# Patient Record
Sex: MALE | Race: BLACK OR AFRICAN AMERICAN | NOT HISPANIC OR LATINO
[De-identification: names, ages, dates, MRNs, and addresses within clinical notes are randomized per-mention and may not be internally consistent; named-entity substitution may affect disease eponyms.]

---

## 2017-07-11 ENCOUNTER — APPOINTMENT (OUTPATIENT)
Dept: CARDIOLOGY | Facility: CLINIC | Age: 82
End: 2017-07-11

## 2017-07-11 VITALS
OXYGEN SATURATION: 99 % | RESPIRATION RATE: 16 BRPM | HEART RATE: 75 BPM | DIASTOLIC BLOOD PRESSURE: 64 MMHG | BODY MASS INDEX: 27.92 KG/M2 | SYSTOLIC BLOOD PRESSURE: 128 MMHG | WEIGHT: 195 LBS | HEIGHT: 70 IN

## 2017-10-10 ENCOUNTER — APPOINTMENT (OUTPATIENT)
Dept: CARDIOLOGY | Facility: CLINIC | Age: 82
End: 2017-10-10
Payer: MEDICARE

## 2017-10-10 VITALS
WEIGHT: 196 LBS | BODY MASS INDEX: 28.06 KG/M2 | OXYGEN SATURATION: 95 % | SYSTOLIC BLOOD PRESSURE: 140 MMHG | RESPIRATION RATE: 16 BRPM | HEART RATE: 75 BPM | HEIGHT: 70 IN | DIASTOLIC BLOOD PRESSURE: 72 MMHG

## 2017-10-10 PROCEDURE — 99214 OFFICE O/P EST MOD 30 MIN: CPT

## 2018-10-18 ENCOUNTER — APPOINTMENT (OUTPATIENT)
Dept: CARDIOLOGY | Facility: CLINIC | Age: 83
End: 2018-10-18

## 2019-02-07 ENCOUNTER — APPOINTMENT (OUTPATIENT)
Dept: CARDIOLOGY | Facility: CLINIC | Age: 84
End: 2019-02-07
Payer: MEDICARE

## 2019-02-07 ENCOUNTER — NON-APPOINTMENT (OUTPATIENT)
Age: 84
End: 2019-02-07

## 2019-02-07 VITALS
HEART RATE: 71 BPM | BODY MASS INDEX: 27.92 KG/M2 | SYSTOLIC BLOOD PRESSURE: 169 MMHG | RESPIRATION RATE: 15 BRPM | OXYGEN SATURATION: 96 % | HEIGHT: 70 IN | DIASTOLIC BLOOD PRESSURE: 86 MMHG | WEIGHT: 195 LBS

## 2019-02-07 DIAGNOSIS — Z48.02 ENCOUNTER FOR REMOVAL OF SUTURES: ICD-10-CM

## 2019-02-07 DIAGNOSIS — Z87.891 PERSONAL HISTORY OF NICOTINE DEPENDENCE: ICD-10-CM

## 2019-02-07 DIAGNOSIS — R09.89 OTHER SPECIFIED SYMPTOMS AND SIGNS INVOLVING THE CIRCULATORY AND RESPIRATORY SYSTEMS: ICD-10-CM

## 2019-02-07 PROCEDURE — 93000 ELECTROCARDIOGRAM COMPLETE: CPT

## 2019-02-07 PROCEDURE — 99204 OFFICE O/P NEW MOD 45 MIN: CPT

## 2019-02-07 RX ORDER — ASPIRIN ENTERIC COATED TABLETS 81 MG 81 MG/1
81 TABLET, DELAYED RELEASE ORAL DAILY
Refills: 0 | Status: ACTIVE | COMMUNITY
Start: 2019-02-07

## 2019-02-07 NOTE — HISTORY OF PRESENT ILLNESS
[FreeTextEntry1] : Patient presents to office today because he is moving this up with her cardiologist in this area. He's been followed for a number of years by Dr. Zuniga at the [facility] for history of CAD and PAD. His PAD has been very significant he does have a prior stent to the right leg and has a lot of issues with claudication in the left leg. He has refused any further intervention. With regards to other symptoms he reports he feels he has low energy and gets short of breath at times although not in any clear pattern. The shortness of breath occurs primarily at rest and he does not really have any exertional shortness of breath. He is at 19 his chest lasting for a few minutes at a time occurring at random and generally at rest. He has no exertional chest pain. Other than his claudication he reports no real exertional complaints at all. Patient denies palpitations, orthopnea, presyncope, syncope.

## 2019-02-07 NOTE — ASSESSMENT
[FreeTextEntry1] : EKG: Sinus rhythm with anterolateral T wave inversions appearing unchanged from prior EKG.\par \par Recent laboratory work includes total cholesterol 172, HDL 50, LDL 94, triglycerides 98, hemoglobin A1c 8.0.\par \par 83-year-old male with a past medical history of CAD, PAD, chronic claudication, hypertension, diabetes who presents today for evaluation to establish care. Mr. Buchanan does have chronic claudication but apparently this on any further intervention and treatment has been with exercise. This likely will be the case going forward. Blood pressures a bit elevated here today and should be checked closely at home to ensure that is adequately controlled. His diabetes has improved however remains uncontrolled. His lipids are well controlled. He has no other symptoms at this time seems to suggest ischemia or CHF.

## 2019-02-07 NOTE — DISCUSSION/SUMMARY
[FreeTextEntry1] : 1. Check nuclear stress test and echocardiogram to reevaluate his CAD.\par 2. Check carotid Doppler to evaluate his bruit given his history of PAD.\par 3. Check abdominal aortic Doppler to rule out aneurysm given his PAD.\par 4. Will not further evaluate his lower extremities as he is known to have severe disease bilaterally, especially on revascularized on the left. He is not willing to consider intervention at this time and will continue with exercise therapy.\par 5. Monitor BP at home, keep a log and bring to f/u.\par 6. Continue current cardiac meds in doses as noted above for CAD, PAD, hypertension.\par 7. Follow up with you for treatment of diabetes.\par 8. Follow up here after testing, and will make further recommendations at that time.

## 2019-02-07 NOTE — PHYSICAL EXAM
[General Appearance - In No Acute Distress] : no acute distress [Normal Conjunctiva] : the conjunctiva exhibited no abnormalities [Normal Oral Mucosa] : normal oral mucosa [Auscultation Breath Sounds / Voice Sounds] : lungs were clear to auscultation bilaterally [Abdomen Soft] : soft [Abdomen Tenderness] : non-tender [Abnormal Walk] : normal gait [Nail Clubbing] : no clubbing of the fingernails [Cyanosis, Localized] : no localized cyanosis [Skin Color & Pigmentation] : normal skin color and pigmentation [Oriented To Time, Place, And Person] : oriented to person, place, and time [Affect] : the affect was normal [Normal Rate] : normal [Rhythm Regular] : regular [Normal S1] : normal S1 [Normal S2] : normal S2 [S4] : an S4 was heard [III] : a grade 3 [S3] : no S3 [FreeTextEntry1] : 1+ b/l distal pulses LE

## 2019-02-14 ENCOUNTER — APPOINTMENT (OUTPATIENT)
Dept: INTERNAL MEDICINE | Facility: CLINIC | Age: 84
End: 2019-02-14
Payer: MEDICARE

## 2019-02-14 VITALS
HEIGHT: 70 IN | DIASTOLIC BLOOD PRESSURE: 68 MMHG | WEIGHT: 198.13 LBS | BODY MASS INDEX: 28.36 KG/M2 | SYSTOLIC BLOOD PRESSURE: 152 MMHG

## 2019-02-14 DIAGNOSIS — Z12.5 ENCOUNTER FOR SCREENING FOR MALIGNANT NEOPLASM OF PROSTATE: ICD-10-CM

## 2019-02-14 DIAGNOSIS — M25.511 PAIN IN RIGHT SHOULDER: ICD-10-CM

## 2019-02-14 DIAGNOSIS — Z00.00 ENCOUNTER FOR GENERAL ADULT MEDICAL EXAMINATION W/OUT ABNORMAL FINDINGS: ICD-10-CM

## 2019-02-14 PROCEDURE — 99497 ADVNCD CARE PLAN 30 MIN: CPT

## 2019-02-14 PROCEDURE — 99214 OFFICE O/P EST MOD 30 MIN: CPT | Mod: 25

## 2019-02-14 PROCEDURE — G0439: CPT

## 2019-02-14 PROCEDURE — 36415 COLL VENOUS BLD VENIPUNCTURE: CPT

## 2019-02-14 NOTE — HEALTH RISK ASSESSMENT
[Good] : ~his/her~  mood as  good [One fall no injury in past year] : Patient reported one fall in the past year without injury [0] : 2) Feeling down, depressed, or hopeless: Not at all (0) [Patient declined mammogram] : Patient declined mammogram [Patient declined PAP Smear] : Patient declined PAP Smear [Patient declined bone density test] : Patient declined bone density test [Patient reported colonoscopy was normal] : Patient reported colonoscopy was normal [HIV test declined] : HIV test declined [Hepatitis C test declined] : Hepatitis C test declined [Discussed at today's visit] : Advance Directives Discussed at today's visit [Patient's preferences updated] : Patient's preferences updated  [Designated Healthcare Proxy] : Designated healthcare proxy [Name: ___] : Health Care Proxy's Name: [unfilled]  [Relationship: ___] : Relationship: [unfilled] [DNR] : DNR [DNI] : DNI [] : No [YUL6Txesc] : 0 [ColonoscopyDate] : 02/14

## 2019-02-14 NOTE — HISTORY OF PRESENT ILLNESS
[FreeTextEntry1] : np est [de-identified] : Mr. LIBERTAD QUARLES is a 83-year-old male with a past medical history of CAD, PAD, chronic claudication, hypertension, diabetes comes to the office for physical exam. Patient feels well and has no complaints at this time.

## 2019-02-15 LAB
ALBUMIN SERPL ELPH-MCNC: 4.2 G/DL
ALP BLD-CCNC: 68 U/L
ALT SERPL-CCNC: 15 U/L
ANION GAP SERPL CALC-SCNC: 12 MMOL/L
AST SERPL-CCNC: 19 U/L
BASOPHILS # BLD AUTO: 0.02 K/UL
BASOPHILS NFR BLD AUTO: 0.5 %
BILIRUB SERPL-MCNC: 0.6 MG/DL
BUN SERPL-MCNC: 24 MG/DL
CALCIUM SERPL-MCNC: 9.4 MG/DL
CHLORIDE SERPL-SCNC: 100 MMOL/L
CHOLEST SERPL-MCNC: 159 MG/DL
CHOLEST/HDLC SERPL: 2.8 RATIO
CO2 SERPL-SCNC: 26 MMOL/L
CREAT SERPL-MCNC: 1.46 MG/DL
EOSINOPHIL # BLD AUTO: 0.09 K/UL
EOSINOPHIL NFR BLD AUTO: 2.1 %
GLUCOSE SERPL-MCNC: 254 MG/DL
HBA1C MFR BLD HPLC: 8.4 %
HCT VFR BLD CALC: 46.6 %
HDLC SERPL-MCNC: 56 MG/DL
HGB BLD-MCNC: 14.9 G/DL
IMM GRANULOCYTES NFR BLD AUTO: 0.2 %
LDLC SERPL CALC-MCNC: 72 MG/DL
LYMPHOCYTES # BLD AUTO: 1.7 K/UL
LYMPHOCYTES NFR BLD AUTO: 39 %
MAN DIFF?: NORMAL
MCHC RBC-ENTMCNC: 26.8 PG
MCHC RBC-ENTMCNC: 32 GM/DL
MCV RBC AUTO: 83.7 FL
MONOCYTES # BLD AUTO: 0.49 K/UL
MONOCYTES NFR BLD AUTO: 11.2 %
NEUTROPHILS # BLD AUTO: 2.05 K/UL
NEUTROPHILS NFR BLD AUTO: 47 %
PLATELET # BLD AUTO: 157 K/UL
POTASSIUM SERPL-SCNC: 4.6 MMOL/L
PROT SERPL-MCNC: 6.8 G/DL
PSA SERPL-MCNC: 6.13 NG/ML
RBC # BLD: 5.57 M/UL
RBC # FLD: 15.2 %
SODIUM SERPL-SCNC: 138 MMOL/L
TRIGL SERPL-MCNC: 156 MG/DL
TSH SERPL-ACNC: 2.35 UIU/ML
WBC # FLD AUTO: 4.36 K/UL

## 2019-02-20 ENCOUNTER — OUTPATIENT (OUTPATIENT)
Dept: OUTPATIENT SERVICES | Facility: HOSPITAL | Age: 84
LOS: 1 days | End: 2019-02-20
Payer: MEDICARE

## 2019-02-20 DIAGNOSIS — Z98.89 OTHER SPECIFIED POSTPROCEDURAL STATES: Chronic | ICD-10-CM

## 2019-02-20 DIAGNOSIS — M25.511 PAIN IN RIGHT SHOULDER: ICD-10-CM

## 2019-02-20 DIAGNOSIS — Z51.89 ENCOUNTER FOR OTHER SPECIFIED AFTERCARE: ICD-10-CM

## 2019-02-25 ENCOUNTER — APPOINTMENT (OUTPATIENT)
Dept: CARDIOLOGY | Facility: CLINIC | Age: 84
End: 2019-02-25
Payer: MEDICARE

## 2019-02-25 PROCEDURE — A9500: CPT

## 2019-02-25 PROCEDURE — 93015 CV STRESS TEST SUPVJ I&R: CPT

## 2019-02-25 PROCEDURE — 78452 HT MUSCLE IMAGE SPECT MULT: CPT

## 2019-02-25 RX ORDER — REGADENOSON 0.08 MG/ML
0.4 INJECTION, SOLUTION INTRAVENOUS
Qty: 1 | Refills: 0 | Status: COMPLETED | OUTPATIENT
Start: 2019-02-25

## 2019-02-25 RX ADMIN — REGADENOSON 0 MG/5ML: 0.08 INJECTION, SOLUTION INTRAVENOUS at 00:00

## 2019-03-01 ENCOUNTER — APPOINTMENT (OUTPATIENT)
Dept: CARDIOLOGY | Facility: CLINIC | Age: 84
End: 2019-03-01

## 2019-03-05 ENCOUNTER — APPOINTMENT (OUTPATIENT)
Dept: CARDIOLOGY | Facility: CLINIC | Age: 84
End: 2019-03-05
Payer: MEDICARE

## 2019-03-05 PROCEDURE — 93978 VASCULAR STUDY: CPT

## 2019-03-06 RX ORDER — KIT FOR THE PREPARATION OF TECHNETIUM TC99M SESTAMIBI 1 MG/5ML
INJECTION, POWDER, LYOPHILIZED, FOR SOLUTION PARENTERAL
Refills: 0 | Status: COMPLETED | OUTPATIENT
Start: 2019-03-06

## 2019-03-06 RX ADMIN — KIT FOR THE PREPARATION OF TECHNETIUM TC99M SESTAMIBI 0: 1 INJECTION, POWDER, LYOPHILIZED, FOR SOLUTION PARENTERAL at 00:00

## 2019-03-29 PROCEDURE — 97140 MANUAL THERAPY 1/> REGIONS: CPT

## 2019-03-29 PROCEDURE — 97110 THERAPEUTIC EXERCISES: CPT

## 2019-03-29 PROCEDURE — 97010 HOT OR COLD PACKS THERAPY: CPT

## 2019-03-29 PROCEDURE — 97162 PT EVAL MOD COMPLEX 30 MIN: CPT

## 2019-04-05 ENCOUNTER — APPOINTMENT (OUTPATIENT)
Dept: CARDIOLOGY | Facility: CLINIC | Age: 84
End: 2019-04-05
Payer: MEDICARE

## 2019-04-05 PROCEDURE — 93306 TTE W/DOPPLER COMPLETE: CPT

## 2019-04-05 PROCEDURE — 93880 EXTRACRANIAL BILAT STUDY: CPT

## 2019-04-08 ENCOUNTER — APPOINTMENT (OUTPATIENT)
Dept: CARDIOLOGY | Facility: CLINIC | Age: 84
End: 2019-04-08
Payer: MEDICARE

## 2019-04-08 VITALS
DIASTOLIC BLOOD PRESSURE: 80 MMHG | WEIGHT: 197 LBS | RESPIRATION RATE: 16 BRPM | SYSTOLIC BLOOD PRESSURE: 144 MMHG | HEART RATE: 69 BPM | HEIGHT: 70 IN | BODY MASS INDEX: 28.2 KG/M2

## 2019-04-08 PROCEDURE — 99214 OFFICE O/P EST MOD 30 MIN: CPT

## 2019-04-08 NOTE — DISCUSSION/SUMMARY
[FreeTextEntry1] : 1. No additional cardiac testing at this time.\par 2. Repeat echocardiogram in 3 months to reevaluate his effusion.\par 3. Initiate lisinopril 20 mg daily given his elevated blood pressure and mean left atrial pressure along with his diabetes.\par 4. Continue other current cardiac meds in doses as noted above for CAD, PAD, hypertension.\par 5. Monitor BP at home, keep a log and bring to f/u.\par 6. Patient is encouraged to exercise at least 30 minutes a day everyday of the week.\par 7. Follow up with you for treatment of diabetes.\par 8. Follow up here in two months.

## 2019-04-08 NOTE — ASSESSMENT
[FreeTextEntry1] : Echocardiogram April 5, 2019 demonstrated left ventricle normal size and function with ejection fraction of 65-70%. Mild LVH noted with elevated left atrial pressure. There was noted to be severe aortic stenosis with mild aortic insufficiency. Mild mitral to cuspid regurgitation noted mild pulmonary hypertension noted. Also a moderate-sized pericardial effusion is noted without evidence of tamponade.\par \par Carotid Doppler April 5, 2019 demonstrated mild to moderate plaque bilaterally with no significant stenosis.\par \par Abdominal aortic Doppler March 5, 2019 showed no evidence of aneurysm.\par \par Nuclear stress test February 25, 2019 with a pharmacologic study tolerated well. EKG showed no evidence of ischemia. Nuclear imaging showed no evidence of ischemia or infarct and an ejection fraction of 52%.\par \par 84-year-old male with a past medical history of CAD, PAD, chronic claudication, hypertension, diabetes who presented for evaluation to [facility] care. Cardiac testing demonstrates no evidence of ischemia or infarct and a normal ejection fraction. He does have significant PAD. Echocardiogram shows severe aortic stenosis which appears to be asymptomatic at this time and a moderate pericardial effusion which is not causing any hemodynamic instability or any symptoms. At this time I will treat all of this conservatively. His primary symptom is still with his leg and his PAD. He is not willing to consider further intervention. May consider the addition of cilostazol.

## 2019-04-08 NOTE — HISTORY OF PRESENT ILLNESS
[FreeTextEntry1] : Patient back to the office today noting his leg seems to be doing worse. His exertion is limited primarily by the issues of his leg. He however is able to ride on 8 exercise bike for half an hour without any other limitations. He is currently dealing with an upper respiratory tract infection and had a sinus infection about 4 months ago. Patient denies chest pain, shortness of breath, palpitations, orthopnea, presyncope, syncope.

## 2019-04-17 ENCOUNTER — APPOINTMENT (OUTPATIENT)
Dept: INTERNAL MEDICINE | Facility: CLINIC | Age: 84
End: 2019-04-17
Payer: MEDICARE

## 2019-04-17 VITALS
TEMPERATURE: 97.6 F | WEIGHT: 197 LBS | HEIGHT: 70 IN | BODY MASS INDEX: 28.2 KG/M2 | DIASTOLIC BLOOD PRESSURE: 78 MMHG | HEART RATE: 65 BPM | OXYGEN SATURATION: 98 % | SYSTOLIC BLOOD PRESSURE: 140 MMHG

## 2019-04-17 DIAGNOSIS — J18.9 PNEUMONIA, UNSPECIFIED ORGANISM: ICD-10-CM

## 2019-04-17 PROCEDURE — 99214 OFFICE O/P EST MOD 30 MIN: CPT

## 2019-04-17 RX ORDER — NITROGLYCERIN 0.4 MG/1
0.4 TABLET SUBLINGUAL
Qty: 25 | Refills: 0 | Status: ACTIVE | COMMUNITY
Start: 2019-01-08

## 2019-04-17 NOTE — PHYSICAL EXAM
[Well Nourished] : well nourished [No Acute Distress] : no acute distress [Well Developed] : well developed [Well-Appearing] : well-appearing [Normal Sclera/Conjunctiva] : normal sclera/conjunctiva [PERRL] : pupils equal round and reactive to light [EOMI] : extraocular movements intact [Normal Outer Ear/Nose] : the outer ears and nose were normal in appearance [Normal Oropharynx] : the oropharynx was normal [No JVD] : no jugular venous distention [Supple] : supple [No Lymphadenopathy] : no lymphadenopathy [Thyroid Normal, No Nodules] : the thyroid was normal and there were no nodules present [No Respiratory Distress] : no respiratory distress  [No Accessory Muscle Use] : no accessory muscle use [Regular Rhythm] : with a regular rhythm [Normal Rate] : normal rate  [No Murmur] : no murmur heard [No Carotid Bruits] : no carotid bruits [Normal S1, S2] : normal S1 and S2 [No Varicosities] : no varicosities [No Abdominal Bruit] : a ~M bruit was not heard ~T in the abdomen [Pedal Pulses Present] : the pedal pulses are present [No Extremity Clubbing/Cyanosis] : no extremity clubbing/cyanosis [No Edema] : there was no peripheral edema [No Palpable Aorta] : no palpable aorta [Soft] : abdomen soft [Non Tender] : non-tender [Non-distended] : non-distended [No Masses] : no abdominal mass palpated [No HSM] : no HSM [Normal Bowel Sounds] : normal bowel sounds [Normal Posterior Cervical Nodes] : no posterior cervical lymphadenopathy [Normal Anterior Cervical Nodes] : no anterior cervical lymphadenopathy [No CVA Tenderness] : no CVA  tenderness [No Spinal Tenderness] : no spinal tenderness [No Joint Swelling] : no joint swelling [Grossly Normal Strength/Tone] : grossly normal strength/tone [No Rash] : no rash [Normal Gait] : normal gait [Coordination Grossly Intact] : coordination grossly intact [No Focal Deficits] : no focal deficits [Deep Tendon Reflexes (DTR)] : deep tendon reflexes were 2+ and symmetric [Normal Insight/Judgement] : insight and judgment were intact [Normal Affect] : the affect was normal

## 2019-04-17 NOTE — HISTORY OF PRESENT ILLNESS
[FreeTextEntry1] : cough / congestion [de-identified] : Mr. LIBERTAD QUARLES is a 83-year-old male with a past medical history of CAD, PAD, chronic claudication, hypertension, diabetes comes to the office c/o productive cough and congestion x 10 day. no other complaints at this time

## 2019-04-17 NOTE — PLAN
[FreeTextEntry1] : Patient likely has Atypical PNA will give antibiotics \par \par patient's BP well controlled with current medication. will continue current  regimen \par \par Counseling included abnormal lab results, differential diagnoses, treatment options, risks and benefits, lifestyle changes, prognosis, current condition, medications, and dose adjustments. \par The patient was interactive, attentive, asked questions, and verbalized understanding

## 2019-05-15 ENCOUNTER — APPOINTMENT (OUTPATIENT)
Dept: INTERNAL MEDICINE | Facility: CLINIC | Age: 84
End: 2019-05-15
Payer: MEDICARE

## 2019-05-15 VITALS
OXYGEN SATURATION: 98 % | SYSTOLIC BLOOD PRESSURE: 142 MMHG | WEIGHT: 197 LBS | TEMPERATURE: 98.1 F | HEIGHT: 70 IN | DIASTOLIC BLOOD PRESSURE: 80 MMHG | BODY MASS INDEX: 28.2 KG/M2 | HEART RATE: 66 BPM

## 2019-05-15 DIAGNOSIS — J06.9 ACUTE UPPER RESPIRATORY INFECTION, UNSPECIFIED: ICD-10-CM

## 2019-05-15 PROCEDURE — 99213 OFFICE O/P EST LOW 20 MIN: CPT

## 2019-05-15 RX ORDER — AZITHROMYCIN 250 MG/1
250 TABLET, FILM COATED ORAL
Qty: 1 | Refills: 0 | Status: DISCONTINUED | COMMUNITY
Start: 2019-04-17 | End: 2019-05-15

## 2019-05-15 NOTE — HISTORY OF PRESENT ILLNESS
[FreeTextEntry1] : cough / still not feeling better [de-identified] : Mr. LIBERTAD QUARLES is a 83-year-old male with a past medical history of CAD, PAD, chronic claudication, hypertension, diabetes comes to the office c/o dry cough and congestion x 3 weeks. no other complaints at this time

## 2019-05-15 NOTE — PLAN
[FreeTextEntry1] : Patient's signs and symptoms consistent with allergic rhinitis. Patient educated about signs and symptoms of allergic rhinitis. Patient advised to continue OTC medication for symptomatic relief.\par \par Counseling included abnormal lab results, differential diagnoses, treatment options, risks and benefits, lifestyle changes, prognosis, current condition, medications, and dose adjustments. \par The patient was interactive, attentive, asked questions, and verbalized understanding

## 2019-05-15 NOTE — PHYSICAL EXAM
[No Acute Distress] : no acute distress [Well Nourished] : well nourished [Well Developed] : well developed [Well-Appearing] : well-appearing [Normal Sclera/Conjunctiva] : normal sclera/conjunctiva [PERRL] : pupils equal round and reactive to light [EOMI] : extraocular movements intact [Normal Outer Ear/Nose] : the outer ears and nose were normal in appearance [Supple] : supple [No JVD] : no jugular venous distention [No Lymphadenopathy] : no lymphadenopathy [Thyroid Normal, No Nodules] : the thyroid was normal and there were no nodules present [No Respiratory Distress] : no respiratory distress  [No Accessory Muscle Use] : no accessory muscle use [Normal Rate] : normal rate  [Regular Rhythm] : with a regular rhythm [Normal S1, S2] : normal S1 and S2 [No Carotid Bruits] : no carotid bruits [No Abdominal Bruit] : a ~M bruit was not heard ~T in the abdomen [No Varicosities] : no varicosities [Pedal Pulses Present] : the pedal pulses are present [No Edema] : there was no peripheral edema [No Extremity Clubbing/Cyanosis] : no extremity clubbing/cyanosis [No Palpable Aorta] : no palpable aorta [Non Tender] : non-tender [Soft] : abdomen soft [Non-distended] : non-distended [No Masses] : no abdominal mass palpated [No HSM] : no HSM [Normal Posterior Cervical Nodes] : no posterior cervical lymphadenopathy [Normal Bowel Sounds] : normal bowel sounds [Normal Anterior Cervical Nodes] : no anterior cervical lymphadenopathy [No CVA Tenderness] : no CVA  tenderness [No Joint Swelling] : no joint swelling [No Spinal Tenderness] : no spinal tenderness [Grossly Normal Strength/Tone] : grossly normal strength/tone [No Rash] : no rash [Coordination Grossly Intact] : coordination grossly intact [Normal Gait] : normal gait [No Focal Deficits] : no focal deficits [Normal Affect] : the affect was normal [Normal Insight/Judgement] : insight and judgment were intact [de-identified] : fullness of TM bilaterally, cobblestoning of pharynx

## 2019-06-20 ENCOUNTER — APPOINTMENT (OUTPATIENT)
Dept: INTERNAL MEDICINE | Facility: CLINIC | Age: 84
End: 2019-06-20
Payer: MEDICARE

## 2019-06-20 VITALS
OXYGEN SATURATION: 98 % | HEIGHT: 70 IN | SYSTOLIC BLOOD PRESSURE: 136 MMHG | WEIGHT: 197 LBS | TEMPERATURE: 98.4 F | DIASTOLIC BLOOD PRESSURE: 80 MMHG | HEART RATE: 63 BPM | BODY MASS INDEX: 28.2 KG/M2

## 2019-06-20 DIAGNOSIS — R97.20 ELEVATED PROSTATE, SPECIFIC ANTIGEN [PSA]: ICD-10-CM

## 2019-06-20 DIAGNOSIS — E78.00 PURE HYPERCHOLESTEROLEMIA, UNSPECIFIED: ICD-10-CM

## 2019-06-20 PROCEDURE — 36415 COLL VENOUS BLD VENIPUNCTURE: CPT

## 2019-06-20 PROCEDURE — 99215 OFFICE O/P EST HI 40 MIN: CPT | Mod: 25

## 2019-06-20 RX ORDER — FLUTICASONE PROPIONATE 50 UG/1
50 SPRAY, METERED NASAL TWICE DAILY
Qty: 1 | Refills: 0 | Status: ACTIVE | COMMUNITY
Start: 2019-06-20 | End: 1900-01-01

## 2019-06-20 NOTE — PHYSICAL EXAM
[No Acute Distress] : no acute distress [Well Nourished] : well nourished [Well Developed] : well developed [Well-Appearing] : well-appearing [Normal Sclera/Conjunctiva] : normal sclera/conjunctiva [PERRL] : pupils equal round and reactive to light [Normal Outer Ear/Nose] : the outer ears and nose were normal in appearance [EOMI] : extraocular movements intact [No JVD] : no jugular venous distention [Supple] : supple [No Lymphadenopathy] : no lymphadenopathy [Thyroid Normal, No Nodules] : the thyroid was normal and there were no nodules present [No Respiratory Distress] : no respiratory distress  [No Accessory Muscle Use] : no accessory muscle use [Normal Rate] : normal rate  [Regular Rhythm] : with a regular rhythm [Normal S1, S2] : normal S1 and S2 [No Carotid Bruits] : no carotid bruits [No Abdominal Bruit] : a ~M bruit was not heard ~T in the abdomen [No Varicosities] : no varicosities [Pedal Pulses Present] : the pedal pulses are present [No Edema] : there was no peripheral edema [No Palpable Aorta] : no palpable aorta [No Extremity Clubbing/Cyanosis] : no extremity clubbing/cyanosis [Soft] : abdomen soft [Non Tender] : non-tender [Non-distended] : non-distended [No Masses] : no abdominal mass palpated [No HSM] : no HSM [Normal Bowel Sounds] : normal bowel sounds [Normal Posterior Cervical Nodes] : no posterior cervical lymphadenopathy [Normal Anterior Cervical Nodes] : no anterior cervical lymphadenopathy [No CVA Tenderness] : no CVA  tenderness [No Spinal Tenderness] : no spinal tenderness [No Joint Swelling] : no joint swelling [Grossly Normal Strength/Tone] : grossly normal strength/tone [No Rash] : no rash [Normal Gait] : normal gait [Coordination Grossly Intact] : coordination grossly intact [No Focal Deficits] : no focal deficits [Normal Affect] : the affect was normal [Normal Insight/Judgement] : insight and judgment were intact [de-identified] : fullness of TM bilaterally, cobblestoning of pharynx

## 2019-06-20 NOTE — HISTORY OF PRESENT ILLNESS
[FreeTextEntry1] : cough / still not feeling better [de-identified] : Mr. LIBERTAD QUARLES is a 83-year-old male with a past medical history of CAD, PAD, chronic claudication, hypertension, diabetes comes to the office c/o dry cough and congestion x 3 weeks. no other complaints at this time

## 2019-06-21 LAB
ALBUMIN SERPL ELPH-MCNC: 4.3 G/DL
ALP BLD-CCNC: 60 U/L
ALT SERPL-CCNC: 21 U/L
ANION GAP SERPL CALC-SCNC: 15 MMOL/L
AST SERPL-CCNC: 23 U/L
BASOPHILS # BLD AUTO: 0.02 K/UL
BASOPHILS NFR BLD AUTO: 0.5 %
BILIRUB SERPL-MCNC: 0.7 MG/DL
BUN SERPL-MCNC: 19 MG/DL
CALCIUM SERPL-MCNC: 9.5 MG/DL
CHLORIDE SERPL-SCNC: 101 MMOL/L
CHOLEST SERPL-MCNC: 177 MG/DL
CHOLEST/HDLC SERPL: 3 RATIO
CO2 SERPL-SCNC: 24 MMOL/L
CREAT SERPL-MCNC: 1.54 MG/DL
EOSINOPHIL # BLD AUTO: 0.13 K/UL
EOSINOPHIL NFR BLD AUTO: 3 %
ESTIMATED AVERAGE GLUCOSE: 174 MG/DL
GLUCOSE SERPL-MCNC: 128 MG/DL
HBA1C MFR BLD HPLC: 7.7 %
HCT VFR BLD CALC: 46.3 %
HDLC SERPL-MCNC: 59 MG/DL
HGB BLD-MCNC: 14.6 G/DL
IMM GRANULOCYTES NFR BLD AUTO: 0.2 %
LDLC SERPL CALC-MCNC: 106 MG/DL
LYMPHOCYTES # BLD AUTO: 1.74 K/UL
LYMPHOCYTES NFR BLD AUTO: 40.1 %
MAN DIFF?: NORMAL
MCHC RBC-ENTMCNC: 26.7 PG
MCHC RBC-ENTMCNC: 31.5 GM/DL
MCV RBC AUTO: 84.6 FL
MONOCYTES # BLD AUTO: 0.54 K/UL
MONOCYTES NFR BLD AUTO: 12.4 %
NEUTROPHILS # BLD AUTO: 1.9 K/UL
NEUTROPHILS NFR BLD AUTO: 43.8 %
PLATELET # BLD AUTO: 155 K/UL
POTASSIUM SERPL-SCNC: 4.9 MMOL/L
PROT SERPL-MCNC: 6.6 G/DL
RBC # BLD: 5.47 M/UL
RBC # FLD: 15.5 %
SODIUM SERPL-SCNC: 140 MMOL/L
TRIGL SERPL-MCNC: 62 MG/DL
WBC # FLD AUTO: 4.34 K/UL

## 2019-06-25 ENCOUNTER — APPOINTMENT (OUTPATIENT)
Dept: CARDIOLOGY | Facility: CLINIC | Age: 84
End: 2019-06-25
Payer: MEDICARE

## 2019-06-25 PROCEDURE — 93308 TTE F-UP OR LMTD: CPT

## 2019-07-15 ENCOUNTER — RX CHANGE (OUTPATIENT)
Age: 84
End: 2019-07-15

## 2019-07-15 ENCOUNTER — NON-APPOINTMENT (OUTPATIENT)
Age: 84
End: 2019-07-15

## 2019-07-15 ENCOUNTER — APPOINTMENT (OUTPATIENT)
Dept: CARDIOLOGY | Facility: CLINIC | Age: 84
End: 2019-07-15
Payer: MEDICARE

## 2019-07-15 VITALS
SYSTOLIC BLOOD PRESSURE: 154 MMHG | HEIGHT: 70 IN | DIASTOLIC BLOOD PRESSURE: 82 MMHG | WEIGHT: 198 LBS | HEART RATE: 69 BPM | BODY MASS INDEX: 28.35 KG/M2 | RESPIRATION RATE: 16 BRPM

## 2019-07-15 DIAGNOSIS — I31.3 PERICARDIAL EFFUSION (NONINFLAMMATORY): ICD-10-CM

## 2019-07-15 PROCEDURE — 99214 OFFICE O/P EST MOD 30 MIN: CPT

## 2019-07-15 PROCEDURE — 93000 ELECTROCARDIOGRAM COMPLETE: CPT

## 2019-07-15 RX ORDER — LEVOCETIRIZINE DIHYDROCHLORIDE 5 MG/1
5 TABLET ORAL DAILY
Qty: 90 | Refills: 0 | Status: DISCONTINUED | COMMUNITY
Start: 2019-06-20 | End: 2019-07-15

## 2019-07-15 NOTE — HISTORY OF PRESENT ILLNESS
[FreeTextEntry1] : Patient comes back to the office today stating he started lisinopril but then stopped it for a week because he was having a cough. The cough did not improve and he started again. He believes the cough actually started before he started the medicine. He has been on multiple medications to try and treat postnasal drip without much improvement. He still has the cough. He reports some intermittent shortness of breath off-and-on but overall this has not been bad. He continues to have issues with claudication without change. Patient denies chest pain, palpitations, orthopnea, presyncope, syncope.  Blood pressures have been in the 110s to 130s over 60s to 70s.

## 2019-07-15 NOTE — PHYSICAL EXAM
[General Appearance - In No Acute Distress] : no acute distress [Normal Conjunctiva] : the conjunctiva exhibited no abnormalities [Normal Oral Mucosa] : normal oral mucosa [Auscultation Breath Sounds / Voice Sounds] : lungs were clear to auscultation bilaterally [Normal Rate] : normal [Rhythm Regular] : regular [Normal S1] : normal S1 [Normal S2] : normal S2 [S4] : an S4 was heard [III] : a grade 3 [Abdomen Soft] : soft [Abdomen Tenderness] : non-tender [Abnormal Walk] : normal gait [Nail Clubbing] : no clubbing of the fingernails [Cyanosis, Localized] : no localized cyanosis [Skin Color & Pigmentation] : normal skin color and pigmentation [Oriented To Time, Place, And Person] : oriented to person, place, and time [Affect] : the affect was normal [S3] : no S3 [FreeTextEntry1] : 1+ b/l distal pulses LE

## 2019-07-15 NOTE — ASSESSMENT
[FreeTextEntry1] : Echocardiogram April 5, 2019 demonstrated left ventricle normal size and function with ejection fraction of 65-70%. Mild LVH noted with elevated left atrial pressure. There was noted to be severe aortic stenosis with mild aortic insufficiency. Mild mitral to cuspid regurgitation noted mild pulmonary hypertension noted. Also a moderate-sized pericardial effusion is noted without evidence of tamponade.\par \par Carotid Doppler April 5, 2019 demonstrated mild to moderate plaque bilaterally with no significant stenosis.\par \par Abdominal aortic Doppler March 5, 2019 showed no evidence of aneurysm.\par \par Nuclear stress test February 25, 2019 with a pharmacologic study tolerated well. EKG showed no evidence of ischemia. Nuclear imaging showed no evidence of ischemia or infarct and an ejection fraction of 52%.\par \par Echocardiogram June 25, 2019 showed a small to moderate circumferential pericardial effusion without evidence of tamponade. No significant change from prior.\par \par 84-year-old male with a past medical history of CAD, PAD, chronic claudication, hypertension, diabetes who presented for evaluation to establish care. The patient's blood pressure seems to have improved with the addition of lisinopril. He is having issues with a cough but this seems to be more likely allergic or postnasal drip rather than secondary to lisinopril. He continues to have issues with claudication and is willing to try Pletal at this time. He is still unwilling to consider any intervention. Echo shows a stable pericardial effusion without evidence of tamponade. No evidence of heart failure at this time.

## 2019-07-15 NOTE — DISCUSSION/SUMMARY
[FreeTextEntry1] : 1. Initiate Pletal 50 mg b.i.d. for claudication.\par 2. No additional cardiac testing at this time.\par 3. Continue other current cardiac meds in doses as noted above for CAD, PAD, hypertension.\par 4. Lipids were somewhat borderline and we'll consider intensifying statin therapy and followup.\par 5. Monitor BP at home, keep a log and bring to f/u.\par 6. Patient is encouraged to exercise at least 30 minutes a day everyday of the week.\par 7. Follow up with his primary doctorfor treatment of diabetes.\par 8. Follow up here in 3 months.

## 2019-08-12 ENCOUNTER — RX CHANGE (OUTPATIENT)
Age: 84
End: 2019-08-12

## 2019-09-18 ENCOUNTER — MEDICATION RENEWAL (OUTPATIENT)
Age: 84
End: 2019-09-18

## 2019-10-17 ENCOUNTER — NON-APPOINTMENT (OUTPATIENT)
Age: 84
End: 2019-10-17

## 2019-10-17 ENCOUNTER — APPOINTMENT (OUTPATIENT)
Dept: CARDIOLOGY | Facility: CLINIC | Age: 84
End: 2019-10-17
Payer: MEDICARE

## 2019-10-17 VITALS
WEIGHT: 196 LBS | BODY MASS INDEX: 29.03 KG/M2 | HEART RATE: 61 BPM | HEIGHT: 69 IN | DIASTOLIC BLOOD PRESSURE: 79 MMHG | RESPIRATION RATE: 15 BRPM | SYSTOLIC BLOOD PRESSURE: 153 MMHG

## 2019-10-17 PROCEDURE — 93000 ELECTROCARDIOGRAM COMPLETE: CPT

## 2019-10-17 PROCEDURE — 99214 OFFICE O/P EST MOD 30 MIN: CPT

## 2019-10-17 RX ORDER — PSYLLIUM HUSK 0.4 G
CAPSULE ORAL
Refills: 0 | Status: ACTIVE | COMMUNITY

## 2019-10-17 RX ORDER — CILOSTAZOL 50 MG/1
50 TABLET ORAL
Qty: 180 | Refills: 1 | Status: DISCONTINUED | COMMUNITY
Start: 2019-07-15 | End: 2019-10-17

## 2019-10-17 RX ORDER — LISINOPRIL 20 MG/1
20 TABLET ORAL DAILY
Qty: 30 | Refills: 3 | Status: DISCONTINUED | COMMUNITY
Start: 2019-04-08 | End: 2019-10-17

## 2019-10-17 RX ORDER — BENZONATATE 100 MG/1
100 CAPSULE ORAL
Qty: 30 | Refills: 0 | Status: DISCONTINUED | COMMUNITY
Start: 2019-05-15 | End: 2019-10-17

## 2019-10-17 RX ORDER — INSULIN ASPART 100 [IU]/ML
(70-30) 100 INJECTION, SUSPENSION SUBCUTANEOUS
Qty: 15 | Refills: 0 | Status: DISCONTINUED | COMMUNITY
Start: 2018-07-16 | End: 2019-10-17

## 2019-10-17 RX ORDER — TAMSULOSIN HYDROCHLORIDE 0.4 MG/1
0.4 CAPSULE ORAL
Refills: 0 | Status: ACTIVE | COMMUNITY

## 2019-10-17 RX ORDER — BLOOD SUGAR DIAGNOSTIC
STRIP MISCELLANEOUS
Refills: 0 | Status: ACTIVE | COMMUNITY
Start: 2019-02-03

## 2019-10-17 RX ORDER — MULTIVITAMIN
TABLET ORAL
Refills: 0 | Status: ACTIVE | COMMUNITY

## 2019-10-17 NOTE — DISCUSSION/SUMMARY
[FreeTextEntry1] : 1. Change lisinopril to Telmisartan HCT 80/12.5 mg daily.\par 2. Check lower extremity arterial Doppler and DE to further evaluate his PAD and consider whether intervention might be helpful.\par 3. Continue other current cardiac meds in doses as noted above for CAD, PAD, hypertension.\par 4. Recheck lipids to determine need for increasing statin therapy.\par 5. Monitor BP at home, keep a log and bring to f/u.\par 6. Patient is encouraged to exercise at least 30 minutes a day everyday of the week.\par 7. Follow up with his primary doctor for treatment of diabetes and his cough.\par 8. Follow up here in 3 months.

## 2019-10-17 NOTE — ASSESSMENT
[FreeTextEntry1] : Echocardiogram April 5, 2019 demonstrated left ventricle normal size and function with ejection fraction of 65-70%. Mild LVH noted with elevated left atrial pressure. There was noted to be severe aortic stenosis with mild aortic insufficiency. Mild mitral to cuspid regurgitation noted mild pulmonary hypertension noted. Also a moderate-sized pericardial effusion is noted without evidence of tamponade.\par \par Carotid Doppler April 5, 2019 demonstrated mild to moderate plaque bilaterally with no significant stenosis.\par \par Abdominal aortic Doppler March 5, 2019 showed no evidence of aneurysm.\par \par Nuclear stress test February 25, 2019 with a pharmacologic study tolerated well. EKG showed no evidence of ischemia. Nuclear imaging showed no evidence of ischemia or infarct and an ejection fraction of 52%.\par \par Echocardiogram June 25, 2019 showed a small to moderate circumferential pericardial effusion without evidence of tamponade. No significant change from prior.\par \par EKG: Sinus rhythm with intraventricular conduction delay. Nonspecific ST and T wave changes in the anterolateral leads not significantly changed from prior.\par \par 84-year-old male with a past medical history of CAD, PAD, chronic claudication, hypertension, diabetes who presents for followup. Patient has had issues with claudication as his main acute cardiovascular problem. He seemed to have some relief from Pletal but stopped because of some bleeding issues in his eye. At this time he is willing to reconsider the possibility of lower semi-intervention and I would like to do some testing to further evaluate his disease. Blood pressure control continues to be somewhat suboptimal. He continues to have issues with a cough which likely is still not related to lisinopril but I will stop that and change him to an ARB to ensure that is not contributing to his cough at all. No evidence of acute cardiac ischemia or CHF at this time.

## 2019-10-17 NOTE — PHYSICAL EXAM
[General Appearance - In No Acute Distress] : no acute distress [Normal Conjunctiva] : the conjunctiva exhibited no abnormalities [Normal Oral Mucosa] : normal oral mucosa [Auscultation Breath Sounds / Voice Sounds] : lungs were clear to auscultation bilaterally [Rhythm Regular] : regular [Normal S1] : normal S1 [Normal Rate] : normal [Normal S2] : normal S2 [S4] : an S4 was heard [III] : a grade 3 [Abdomen Soft] : soft [Abdomen Tenderness] : non-tender [Abnormal Walk] : normal gait [Cyanosis, Localized] : no localized cyanosis [Nail Clubbing] : no clubbing of the fingernails [Skin Color & Pigmentation] : normal skin color and pigmentation [Oriented To Time, Place, And Person] : oriented to person, place, and time [Affect] : the affect was normal [S3] : no S3 [FreeTextEntry1] : 1+ b/l distal pulses LE

## 2019-10-17 NOTE — HISTORY OF PRESENT ILLNESS
[FreeTextEntry1] : Patient returns to the office today having tried Pletal but then developing some bleeding in his eye and so he stopped it because of concerns it was a blood thinner. He notes he feels his claudication symptoms did improve somewhat with the medication. He is not completely opposed to intervention on his legs at this time given the improvement he did have. He reports having a little bit of chest discomfort at this time since he lifted a heavy suitcase. This is made worse by deep breathing and certain movements of his arm and chest. He notes a little bit of shortness of breath at times when his sugar is high but otherwise he reports no shortness of breath. He also reports some intermittent dizziness at times. His issues with coughing continued to be a problem but he recently was seen at an urgent care and received a cough medication which did seem to help briefly.  Patient denies palpitations, orthopnea, presyncope, syncope.

## 2019-10-21 ENCOUNTER — APPOINTMENT (OUTPATIENT)
Dept: INTERNAL MEDICINE | Facility: CLINIC | Age: 84
End: 2019-10-21
Payer: MEDICARE

## 2019-10-21 ENCOUNTER — FORM ENCOUNTER (OUTPATIENT)
Age: 84
End: 2019-10-21

## 2019-10-21 ENCOUNTER — MED ADMIN CHARGE (OUTPATIENT)
Age: 84
End: 2019-10-21

## 2019-10-21 VITALS
DIASTOLIC BLOOD PRESSURE: 77 MMHG | TEMPERATURE: 97.9 F | SYSTOLIC BLOOD PRESSURE: 159 MMHG | HEART RATE: 73 BPM | OXYGEN SATURATION: 96 %

## 2019-10-21 DIAGNOSIS — Z23 ENCOUNTER FOR IMMUNIZATION: ICD-10-CM

## 2019-10-21 DIAGNOSIS — J30.9 ALLERGIC RHINITIS, UNSPECIFIED: ICD-10-CM

## 2019-10-21 PROCEDURE — 36415 COLL VENOUS BLD VENIPUNCTURE: CPT

## 2019-10-21 PROCEDURE — 90662 IIV NO PRSV INCREASED AG IM: CPT

## 2019-10-21 PROCEDURE — 99214 OFFICE O/P EST MOD 30 MIN: CPT | Mod: 25

## 2019-10-21 PROCEDURE — G0008: CPT

## 2019-10-21 RX ORDER — BENZONATATE 200 MG/1
200 CAPSULE ORAL
Qty: 30 | Refills: 0 | Status: ACTIVE | COMMUNITY
Start: 2019-10-21 | End: 1900-01-01

## 2019-10-21 RX ORDER — AZELASTINE HYDROCHLORIDE 137 UG/1
137 SPRAY, METERED NASAL DAILY
Qty: 1 | Refills: 0 | Status: ACTIVE | COMMUNITY
Start: 2019-10-21 | End: 1900-01-01

## 2019-10-21 NOTE — HISTORY OF PRESENT ILLNESS
[FreeTextEntry1] : cough / still not feeling better [de-identified] : Mr. LIBERTAD QUARLES is a 84-year-old male with a past medical history of CAD, PAD, chronic claudication, hypertension, diabetes comes to the office c/o dry cough and congestion x 6 weeks. no other complaints at this time

## 2019-10-21 NOTE — PHYSICAL EXAM
[No Acute Distress] : no acute distress [Well Nourished] : well nourished [Well Developed] : well developed [Well-Appearing] : well-appearing [PERRL] : pupils equal round and reactive to light [Normal Sclera/Conjunctiva] : normal sclera/conjunctiva [EOMI] : extraocular movements intact [Normal Outer Ear/Nose] : the outer ears and nose were normal in appearance [No JVD] : no jugular venous distention [Supple] : supple [No Lymphadenopathy] : no lymphadenopathy [No Respiratory Distress] : no respiratory distress  [Thyroid Normal, No Nodules] : the thyroid was normal and there were no nodules present [No Accessory Muscle Use] : no accessory muscle use [Normal Rate] : normal rate  [Regular Rhythm] : with a regular rhythm [Normal S1, S2] : normal S1 and S2 [No Carotid Bruits] : no carotid bruits [No Abdominal Bruit] : a ~M bruit was not heard ~T in the abdomen [Pedal Pulses Present] : the pedal pulses are present [No Varicosities] : no varicosities [No Extremity Clubbing/Cyanosis] : no extremity clubbing/cyanosis [No Edema] : there was no peripheral edema [No Palpable Aorta] : no palpable aorta [Soft] : abdomen soft [Non Tender] : non-tender [Non-distended] : non-distended [No Masses] : no abdominal mass palpated [No HSM] : no HSM [Normal Bowel Sounds] : normal bowel sounds [Normal Posterior Cervical Nodes] : no posterior cervical lymphadenopathy [Normal Anterior Cervical Nodes] : no anterior cervical lymphadenopathy [No Spinal Tenderness] : no spinal tenderness [No CVA Tenderness] : no CVA  tenderness [Grossly Normal Strength/Tone] : grossly normal strength/tone [No Joint Swelling] : no joint swelling [No Rash] : no rash [Normal Gait] : normal gait [Coordination Grossly Intact] : coordination grossly intact [No Focal Deficits] : no focal deficits [Normal Affect] : the affect was normal [Normal Insight/Judgement] : insight and judgment were intact [de-identified] : fullness of TM bilaterally, cobblestoning of pharynx

## 2019-10-21 NOTE — PLAN
[FreeTextEntry1] : Patient's signs and symptoms consistent with allergic rhinitis. Patient educated about signs and symptoms of allergic rhinitis. Patient advised to continue OTC medication for symptomatic relief.\par Patient was also referred to ENT \par \par in regards to patient's chronic cough, CXR ordered\par \par Counseling included abnormal lab results, differential diagnoses, treatment options, risks and benefits, lifestyle changes, prognosis, current condition, medications, and dose adjustments. \par The patient was interactive, attentive, asked questions, and verbalized understanding

## 2019-10-22 ENCOUNTER — OUTPATIENT (OUTPATIENT)
Dept: OUTPATIENT SERVICES | Facility: HOSPITAL | Age: 84
LOS: 1 days | End: 2019-10-22
Payer: MEDICARE

## 2019-10-22 ENCOUNTER — APPOINTMENT (OUTPATIENT)
Dept: RADIOLOGY | Facility: CLINIC | Age: 84
End: 2019-10-22
Payer: MEDICARE

## 2019-10-22 DIAGNOSIS — R05 COUGH: ICD-10-CM

## 2019-10-22 DIAGNOSIS — Z98.89 OTHER SPECIFIED POSTPROCEDURAL STATES: Chronic | ICD-10-CM

## 2019-10-22 DIAGNOSIS — Z00.00 ENCOUNTER FOR GENERAL ADULT MEDICAL EXAMINATION WITHOUT ABNORMAL FINDINGS: ICD-10-CM

## 2019-10-22 LAB
ALBUMIN SERPL ELPH-MCNC: 4.2 G/DL
ALP BLD-CCNC: 69 U/L
ALT SERPL-CCNC: 17 U/L
ANION GAP SERPL CALC-SCNC: 14 MMOL/L
AST SERPL-CCNC: 19 U/L
BASOPHILS # BLD AUTO: 0.03 K/UL
BASOPHILS NFR BLD AUTO: 0.7 %
BILIRUB SERPL-MCNC: 0.6 MG/DL
BUN SERPL-MCNC: 20 MG/DL
CALCIUM SERPL-MCNC: 9.2 MG/DL
CHLORIDE SERPL-SCNC: 101 MMOL/L
CO2 SERPL-SCNC: 24 MMOL/L
CREAT SERPL-MCNC: 1.45 MG/DL
EOSINOPHIL # BLD AUTO: 0.12 K/UL
EOSINOPHIL NFR BLD AUTO: 2.9 %
ESTIMATED AVERAGE GLUCOSE: 174 MG/DL
GLUCOSE SERPL-MCNC: 187 MG/DL
HBA1C MFR BLD HPLC: 7.7 %
HCT VFR BLD CALC: 46.8 %
HGB BLD-MCNC: 14.5 G/DL
IMM GRANULOCYTES NFR BLD AUTO: 0.2 %
LYMPHOCYTES # BLD AUTO: 1.59 K/UL
LYMPHOCYTES NFR BLD AUTO: 38 %
MAN DIFF?: NORMAL
MCHC RBC-ENTMCNC: 26 PG
MCHC RBC-ENTMCNC: 31 GM/DL
MCV RBC AUTO: 83.9 FL
MONOCYTES # BLD AUTO: 0.53 K/UL
MONOCYTES NFR BLD AUTO: 12.7 %
NEUTROPHILS # BLD AUTO: 1.9 K/UL
NEUTROPHILS NFR BLD AUTO: 45.5 %
PLATELET # BLD AUTO: 148 K/UL
POTASSIUM SERPL-SCNC: 4.1 MMOL/L
PROT SERPL-MCNC: 6.6 G/DL
RBC # BLD: 5.58 M/UL
RBC # FLD: 15.4 %
SODIUM SERPL-SCNC: 139 MMOL/L
WBC # FLD AUTO: 4.18 K/UL

## 2019-10-22 PROCEDURE — 71046 X-RAY EXAM CHEST 2 VIEWS: CPT | Mod: 26

## 2019-10-22 PROCEDURE — 71046 X-RAY EXAM CHEST 2 VIEWS: CPT

## 2019-11-06 ENCOUNTER — APPOINTMENT (OUTPATIENT)
Dept: CARDIOLOGY | Facility: CLINIC | Age: 84
End: 2019-11-06
Payer: MEDICARE

## 2019-11-06 PROCEDURE — 93925 LOWER EXTREMITY STUDY: CPT

## 2019-11-08 ENCOUNTER — APPOINTMENT (OUTPATIENT)
Dept: CARDIOLOGY | Facility: CLINIC | Age: 84
End: 2019-11-08
Payer: MEDICARE

## 2019-11-08 PROCEDURE — 93923 UPR/LXTR ART STDY 3+ LVLS: CPT

## 2019-11-22 ENCOUNTER — MEDICATION RENEWAL (OUTPATIENT)
Age: 84
End: 2019-11-22

## 2019-11-22 RX ORDER — PEN NEEDLE, DIABETIC 29 G X1/2"
32G X 4 MM NEEDLE, DISPOSABLE MISCELLANEOUS
Qty: 1 | Refills: 2 | Status: ACTIVE | COMMUNITY
Start: 2019-01-28 | End: 1900-01-01

## 2019-12-02 ENCOUNTER — MEDICATION RENEWAL (OUTPATIENT)
Age: 84
End: 2019-12-02

## 2019-12-02 RX ORDER — PANTOPRAZOLE 40 MG/1
40 TABLET, DELAYED RELEASE ORAL
Qty: 90 | Refills: 3 | Status: ACTIVE | COMMUNITY
Start: 2019-02-02 | End: 1900-01-01

## 2019-12-10 ENCOUNTER — MEDICATION RENEWAL (OUTPATIENT)
Age: 84
End: 2019-12-10

## 2019-12-10 ENCOUNTER — RX RENEWAL (OUTPATIENT)
Age: 84
End: 2019-12-10

## 2019-12-10 RX ORDER — TELMISARTAN AND HYDROCHLOROTHIAZIDE 80; 12.5 MG/1; MG/1
80-12.5 TABLET ORAL
Qty: 90 | Refills: 1 | Status: ACTIVE | COMMUNITY
Start: 2019-10-17 | End: 1900-01-01

## 2020-01-06 ENCOUNTER — NON-APPOINTMENT (OUTPATIENT)
Age: 85
End: 2020-01-06

## 2020-01-06 ENCOUNTER — APPOINTMENT (OUTPATIENT)
Dept: CARDIOLOGY | Facility: CLINIC | Age: 85
End: 2020-01-06
Payer: MEDICARE

## 2020-01-06 VITALS
HEIGHT: 69 IN | RESPIRATION RATE: 16 BRPM | OXYGEN SATURATION: 98 % | HEART RATE: 69 BPM | BODY MASS INDEX: 28.73 KG/M2 | WEIGHT: 194 LBS | DIASTOLIC BLOOD PRESSURE: 87 MMHG | SYSTOLIC BLOOD PRESSURE: 163 MMHG

## 2020-01-06 DIAGNOSIS — I73.9 PERIPHERAL VASCULAR DISEASE, UNSPECIFIED: ICD-10-CM

## 2020-01-06 PROCEDURE — 99215 OFFICE O/P EST HI 40 MIN: CPT

## 2020-01-06 PROCEDURE — 93000 ELECTROCARDIOGRAM COMPLETE: CPT

## 2020-01-06 NOTE — ASSESSMENT
[FreeTextEntry1] : Echocardiogram April 5, 2019 demonstrated left ventricle normal size and function with ejection fraction of 65-70%. Mild LVH noted with elevated left atrial pressure. There was noted to be severe aortic stenosis with mild aortic insufficiency. Mild mitral to cuspid regurgitation noted mild pulmonary hypertension noted. Also a moderate-sized pericardial effusion is noted without evidence of tamponade.\par \par Carotid Doppler April 5, 2019 demonstrated mild to moderate plaque bilaterally with no significant stenosis.\par \par Abdominal aortic Doppler March 5, 2019 showed no evidence of aneurysm.\par \par Nuclear stress test February 25, 2019 with a pharmacologic study tolerated well. EKG showed no evidence of ischemia. Nuclear imaging showed no evidence of ischemia or infarct and an ejection fraction of 52%.\par \par Echocardiogram June 25, 2019 showed a small to moderate circumferential pericardial effusion without evidence of tamponade. No significant change from prior.\par \par DE November 8, 2019 demonstrated moderate to severe reduction in DE on the left with a ratio of 0.43. Normal on the right and 0.91.\par \par Lower should be arterial Doppler November 6, 2019 demonstrated moderate to severe atherosclerosis throughout the lower extremities. Patent stent noted in the right mid SFA with no significant stenosis on the right side. Left proximal SFA is occluded with reconstitution of the mid SFA and monophasic flow distally. Left PTA was not visualized.\par \par EKG: Sinus rhythm with intraventricular conduction delay. Nonspecific ST and T wave changes in the anterolateral leads not significantly changed from prior.\par \par 84-year-old male with a past medical history of CAD, PAD, chronic claudication, hypertension, diabetes who presents for followup. Patient is to have severe issues with claudication in his left leg. DE was moderate to severely reduced and his arterial Doppler shows total occlusion of his left SFA with distal reconstitution but only monophasic flow distally. At this time I have recommended angiography and possible stenting to improve his claudication symptoms. He also has been taking his atorvastatin 40 mg alternating with 20 mg and encouraged to take at 40 mg every day given elevated LDL and his vascular disease. He is willing to try this at this time and I will repeat lipids at followup. Blood pressure seems to be reasonably controlled but it needs to be checked more often to ensure that it is in fact well controlled. No evidence of cardiac ischemia or CHF at this time.

## 2020-01-06 NOTE — PHYSICAL EXAM
[General Appearance - In No Acute Distress] : no acute distress [Normal Conjunctiva] : the conjunctiva exhibited no abnormalities [Normal Oral Mucosa] : normal oral mucosa [Auscultation Breath Sounds / Voice Sounds] : lungs were clear to auscultation bilaterally [Normal Rate] : normal [Rhythm Regular] : regular [Normal S1] : normal S1 [Normal S2] : normal S2 [S4] : an S4 was heard [III] : a grade 3 [Abdomen Tenderness] : non-tender [Abdomen Soft] : soft [Abnormal Walk] : normal gait [Nail Clubbing] : no clubbing of the fingernails [Cyanosis, Localized] : no localized cyanosis [Oriented To Time, Place, And Person] : oriented to person, place, and time [Skin Color & Pigmentation] : normal skin color and pigmentation [Affect] : the affect was normal [S3] : no S3 [FreeTextEntry1] : 1+ b/l distal pulses LE

## 2020-01-06 NOTE — DISCUSSION/SUMMARY
[FreeTextEntry1] : 1. Lower extremity arterial angiogram to evaluate his claudication and PAD.\par 2. He will start taking Lipitor again at 40 mg every day. Repeat blood work prior to followup.\par 3. Continue other current cardiac meds in doses as noted above for CAD, PAD, hypertension.\par 4. Monitor BP at home, keep a log and bring to f/u.\par 5. Patient is encouraged to exercise at least 30 minutes a day everyday of the week.\par 6. Follow up with his primary doctor for treatment of diabetes and his cough.\par 7. Follow up here in six weeks.

## 2020-01-06 NOTE — HISTORY OF PRESENT ILLNESS
[FreeTextEntry1] : Patient returns to the office today still having a lot of issues with claudication primarily in his left leg. He has pain with really any ambulation at this point. His right leg is not much of an issue. He does state because of the pains in his leg he has been taking his Lipitor at 40 mg alternating with 20 mg every other day. He notes some shortness of breath which he blames on times that his sugar is high. He generally does not have any significant dyspnea on exertion within the limits of his claudication. His blood pressures have been in the 120s to 130s over 60s to 70s. Patient denies chest pain, palpitations, orthopnea, presyncope, syncope.

## 2020-01-08 LAB
ALBUMIN SERPL ELPH-MCNC: 4 G/DL
ALP BLD-CCNC: 68 U/L
ALT SERPL-CCNC: 13 U/L
ANION GAP SERPL CALC-SCNC: 12 MMOL/L
AST SERPL-CCNC: 19 U/L
BILIRUB SERPL-MCNC: 0.5 MG/DL
BUN SERPL-MCNC: 22 MG/DL
CALCIUM SERPL-MCNC: 9 MG/DL
CHLORIDE SERPL-SCNC: 102 MMOL/L
CHOLEST SERPL-MCNC: 174 MG/DL
CHOLEST/HDLC SERPL: 3 RATIO
CO2 SERPL-SCNC: 28 MMOL/L
CREAT SERPL-MCNC: 1.64 MG/DL
GLUCOSE SERPL-MCNC: 124 MG/DL
HDLC SERPL-MCNC: 59 MG/DL
LDLC SERPL CALC-MCNC: 99 MG/DL
MAGNESIUM SERPL-MCNC: 2.2 MG/DL
NT-PROBNP SERPL-MCNC: 1020 PG/ML
POTASSIUM SERPL-SCNC: 4.3 MMOL/L
PROT SERPL-MCNC: 6.4 G/DL
SODIUM SERPL-SCNC: 142 MMOL/L
TRIGL SERPL-MCNC: 79 MG/DL

## 2020-01-09 ENCOUNTER — OUTPATIENT (OUTPATIENT)
Dept: OUTPATIENT SERVICES | Facility: HOSPITAL | Age: 85
LOS: 1 days | End: 2020-01-09
Payer: MEDICARE

## 2020-01-09 VITALS
SYSTOLIC BLOOD PRESSURE: 174 MMHG | DIASTOLIC BLOOD PRESSURE: 68 MMHG | WEIGHT: 195.11 LBS | OXYGEN SATURATION: 100 % | RESPIRATION RATE: 18 BRPM | HEIGHT: 70 IN | HEART RATE: 68 BPM | TEMPERATURE: 98 F

## 2020-01-09 DIAGNOSIS — Z98.89 OTHER SPECIFIED POSTPROCEDURAL STATES: Chronic | ICD-10-CM

## 2020-01-09 DIAGNOSIS — Z01.818 ENCOUNTER FOR OTHER PREPROCEDURAL EXAMINATION: ICD-10-CM

## 2020-01-09 LAB
ANION GAP SERPL CALC-SCNC: 13 MMOL/L — SIGNIFICANT CHANGE UP (ref 5–17)
APTT BLD: 39 SEC — HIGH (ref 27.5–36.3)
BASOPHILS # BLD AUTO: 0.04 K/UL — SIGNIFICANT CHANGE UP (ref 0–0.2)
BASOPHILS NFR BLD AUTO: 0.8 % — SIGNIFICANT CHANGE UP (ref 0–2)
BUN SERPL-MCNC: 21 MG/DL — HIGH (ref 8–20)
CALCIUM SERPL-MCNC: 8.8 MG/DL — SIGNIFICANT CHANGE UP (ref 8.6–10.2)
CHLORIDE SERPL-SCNC: 102 MMOL/L — SIGNIFICANT CHANGE UP (ref 98–107)
CO2 SERPL-SCNC: 23 MMOL/L — SIGNIFICANT CHANGE UP (ref 22–29)
CREAT SERPL-MCNC: 1.42 MG/DL — HIGH (ref 0.5–1.3)
EOSINOPHIL # BLD AUTO: 0.13 K/UL — SIGNIFICANT CHANGE UP (ref 0–0.5)
EOSINOPHIL NFR BLD AUTO: 2.6 % — SIGNIFICANT CHANGE UP (ref 0–6)
GLUCOSE SERPL-MCNC: 150 MG/DL — HIGH (ref 70–115)
HCT VFR BLD CALC: 47.3 % — SIGNIFICANT CHANGE UP (ref 39–50)
HGB BLD-MCNC: 15.2 G/DL — SIGNIFICANT CHANGE UP (ref 13–17)
IMM GRANULOCYTES NFR BLD AUTO: 0.4 % — SIGNIFICANT CHANGE UP (ref 0–1.5)
INR BLD: 1.04 RATIO — SIGNIFICANT CHANGE UP (ref 0.88–1.16)
LYMPHOCYTES # BLD AUTO: 1.94 K/UL — SIGNIFICANT CHANGE UP (ref 1–3.3)
LYMPHOCYTES # BLD AUTO: 38.5 % — SIGNIFICANT CHANGE UP (ref 13–44)
MCHC RBC-ENTMCNC: 26.5 PG — LOW (ref 27–34)
MCHC RBC-ENTMCNC: 32.1 GM/DL — SIGNIFICANT CHANGE UP (ref 32–36)
MCV RBC AUTO: 82.5 FL — SIGNIFICANT CHANGE UP (ref 80–100)
MONOCYTES # BLD AUTO: 0.64 K/UL — SIGNIFICANT CHANGE UP (ref 0–0.9)
MONOCYTES NFR BLD AUTO: 12.7 % — SIGNIFICANT CHANGE UP (ref 2–14)
NEUTROPHILS # BLD AUTO: 2.27 K/UL — SIGNIFICANT CHANGE UP (ref 1.8–7.4)
NEUTROPHILS NFR BLD AUTO: 45 % — SIGNIFICANT CHANGE UP (ref 43–77)
PLATELET # BLD AUTO: 179 K/UL — SIGNIFICANT CHANGE UP (ref 150–400)
POTASSIUM SERPL-MCNC: 4.2 MMOL/L — SIGNIFICANT CHANGE UP (ref 3.5–5.3)
POTASSIUM SERPL-SCNC: 4.2 MMOL/L — SIGNIFICANT CHANGE UP (ref 3.5–5.3)
PROTHROM AB SERPL-ACNC: 12 SEC — SIGNIFICANT CHANGE UP (ref 10–12.9)
RBC # BLD: 5.73 M/UL — SIGNIFICANT CHANGE UP (ref 4.2–5.8)
RBC # FLD: 15.3 % — HIGH (ref 10.3–14.5)
SODIUM SERPL-SCNC: 138 MMOL/L — SIGNIFICANT CHANGE UP (ref 135–145)
WBC # BLD: 5.04 K/UL — SIGNIFICANT CHANGE UP (ref 3.8–10.5)
WBC # FLD AUTO: 5.04 K/UL — SIGNIFICANT CHANGE UP (ref 3.8–10.5)

## 2020-01-09 PROCEDURE — 85610 PROTHROMBIN TIME: CPT

## 2020-01-09 PROCEDURE — G0463: CPT

## 2020-01-09 PROCEDURE — 36415 COLL VENOUS BLD VENIPUNCTURE: CPT

## 2020-01-09 PROCEDURE — 93010 ELECTROCARDIOGRAM REPORT: CPT

## 2020-01-09 PROCEDURE — 93005 ELECTROCARDIOGRAM TRACING: CPT

## 2020-01-09 PROCEDURE — 85730 THROMBOPLASTIN TIME PARTIAL: CPT

## 2020-01-09 PROCEDURE — 85027 COMPLETE CBC AUTOMATED: CPT

## 2020-01-09 PROCEDURE — 80048 BASIC METABOLIC PNL TOTAL CA: CPT

## 2020-01-09 NOTE — H&P PST ADULT - NS SC CAGE ALCOHOL ANNOYED YOU
[de-identified] : The patient is a well developed, well nourished male in no apparent distress. He is alert and oriented X 3 with a pleasant mood and appropriate affect. \par \par On physical examination of the right knee, his ROM is 0-120 degrees. The patient walks with a normal gait and stands in neutral alignment. There is 1+ effusion. No warmth or erythema is noted. The patella is non tender to palpation medially or laterally. There is no crepitus noted. The apprehension and grind tests are negative. The extensor mechanism is intact. There is medial joint line tenderness. The Cristi sign is positive. The Lachman and pivot shift tests are negative. There is no varus or valgus laxity at 0 or 30 degrees. No posterolateral or anteromedial laxity is noted. No masses are palpable. No other soft tissue or bony tenderness is noted. Quadriceps weakness is noted. Neurovascular function is intact.   [de-identified] : MRI of the right knee shows a complex tear in the posterior horn of the medial meniscus  no

## 2020-01-09 NOTE — H&P PST ADULT - HISTORY OF PRESENT ILLNESS
85 yo male with pmhx PAD, HTN, CAD, DM, HLD who presents for PST for LE peripheral angio. He has c/o claudication and sob. Denies chest pain, palps.     DE 11/2019: demonstrated moderate to severe reduction in DE on the left with a ratio of 0.43. Normal on the right and 0.91.    Arterial doppler 11/2019: moderate to severe atherosclerosis throughout the lower extremities. Patent stent noted in the right mid SFA with no significant stenosis on the right side. Left proximal SFA with no significant stenosis on the right side. Left proximal SFA is occluded with reconstitution of the mid SFA and monophasic flow distally. Left PTA was not visualized.     BRA: 85 yo male with pmhx PAD, HTN, CAD, DM, HLD who presents for PST for LE peripheral angio. He has c/o claudication and sob. Denies chest pain, palps.     DE 11/2019: demonstrated moderate to severe reduction in DE on the left with a ratio of 0.43. Normal on the right and 0.91.    Arterial doppler 11/2019: moderate to severe atherosclerosis throughout the lower extremities. Patent stent noted in the right mid SFA with no significant stenosis on the right side. Left proximal SFA with no significant stenosis on the right side. Left proximal SFA is occluded with reconstitution of the mid SFA and monophasic flow distally. Left PTA was not visualized.     BRA: 1.5%

## 2020-01-09 NOTE — H&P PST ADULT - ASSESSMENT
83 yo male with claudication for LE peripheral angiography  -Hold glipizide and insulin day of procedure  -1/2 dose insulin thursday night  -Proceed with procedure as planned  -Procedure discussed at length with patient  -Continue current meds

## 2020-01-09 NOTE — H&P PST ADULT - NSICDXPASTMEDICALHX_GEN_ALL_CORE_FT
PAST MEDICAL HISTORY:  Diabetes     High cholesterol     HTN (hypertension) PAST MEDICAL HISTORY:  Diabetes     High cholesterol     HTN (hypertension)     PAD (peripheral artery disease) s/p stent

## 2020-01-10 ENCOUNTER — MEDICATION RENEWAL (OUTPATIENT)
Age: 85
End: 2020-01-10

## 2020-01-17 ENCOUNTER — OUTPATIENT (OUTPATIENT)
Dept: OUTPATIENT SERVICES | Facility: HOSPITAL | Age: 85
LOS: 1 days | Discharge: HOME | End: 2020-01-17
Payer: MEDICARE

## 2020-01-17 ENCOUNTER — TRANSCRIPTION ENCOUNTER (OUTPATIENT)
Age: 85
End: 2020-01-17

## 2020-01-17 VITALS
RESPIRATION RATE: 12 BRPM | OXYGEN SATURATION: 100 % | HEART RATE: 75 BPM | TEMPERATURE: 98 F | SYSTOLIC BLOOD PRESSURE: 164 MMHG | DIASTOLIC BLOOD PRESSURE: 77 MMHG

## 2020-01-17 VITALS
RESPIRATION RATE: 16 BRPM | SYSTOLIC BLOOD PRESSURE: 143 MMHG | DIASTOLIC BLOOD PRESSURE: 74 MMHG | HEART RATE: 65 BPM | OXYGEN SATURATION: 98 %

## 2020-01-17 DIAGNOSIS — Z98.89 OTHER SPECIFIED POSTPROCEDURAL STATES: Chronic | ICD-10-CM

## 2020-01-17 DIAGNOSIS — I73.9 PERIPHERAL VASCULAR DISEASE, UNSPECIFIED: ICD-10-CM

## 2020-01-17 LAB — GLUCOSE BLDC GLUCOMTR-MCNC: 119 MG/DL — HIGH (ref 70–99)

## 2020-01-17 PROCEDURE — 99152 MOD SED SAME PHYS/QHP 5/>YRS: CPT

## 2020-01-17 PROCEDURE — 82962 GLUCOSE BLOOD TEST: CPT

## 2020-01-17 PROCEDURE — 75716 ARTERY X-RAYS ARMS/LEGS: CPT

## 2020-01-17 PROCEDURE — C1887: CPT

## 2020-01-17 PROCEDURE — 36246 INS CATH ABD/L-EXT ART 2ND: CPT

## 2020-01-17 PROCEDURE — 99153 MOD SED SAME PHYS/QHP EA: CPT

## 2020-01-17 PROCEDURE — 75716 ARTERY X-RAYS ARMS/LEGS: CPT | Mod: 26

## 2020-01-17 PROCEDURE — C1769: CPT

## 2020-01-17 PROCEDURE — C1894: CPT

## 2020-01-17 PROCEDURE — 99203 OFFICE O/P NEW LOW 30 MIN: CPT

## 2020-01-17 RX ORDER — TAMSULOSIN HYDROCHLORIDE 0.4 MG/1
1 CAPSULE ORAL
Qty: 0 | Refills: 0 | DISCHARGE

## 2020-01-17 RX ORDER — TELMISARTAN AND HYDROCHLOROTHIAZIDE 40; 12.5 MG/1; MG/1
1 TABLET ORAL
Qty: 0 | Refills: 0 | DISCHARGE

## 2020-01-17 RX ORDER — UBIDECARENONE 100 MG
1 CAPSULE ORAL
Qty: 0 | Refills: 0 | DISCHARGE

## 2020-01-17 RX ORDER — ASPIRIN/CALCIUM CARB/MAGNESIUM 324 MG
1 TABLET ORAL
Qty: 0 | Refills: 0 | DISCHARGE

## 2020-01-17 RX ORDER — OMEGA-3 ACID ETHYL ESTERS 1 G
1 CAPSULE ORAL
Qty: 0 | Refills: 0 | DISCHARGE

## 2020-01-17 RX ORDER — INSULIN ASPART 100 [IU]/ML
23 INJECTION, SUSPENSION SUBCUTANEOUS
Qty: 0 | Refills: 0 | DISCHARGE

## 2020-01-17 RX ORDER — INSULIN ASPART 100 [IU]/ML
17 INJECTION, SOLUTION SUBCUTANEOUS
Qty: 0 | Refills: 0 | DISCHARGE

## 2020-01-17 NOTE — PROGRESS NOTE ADULT - ASSESSMENT
Procedure: BLE Angiography  Pre-procedure Diagnosis: PAD  Post-procedure Diagnosis: PAD    1. Vascular surgery evaluation    2. Sheath: Remove RFA sheath now    4. Bedrest: 4 hours    4. DAPT: Aspirin 81 mg daily    5. Statin: Lipitor 40 mg daily    6. Continue current medications.    5. Discharge Plan: Home today

## 2020-01-17 NOTE — DISCHARGE NOTE PROVIDER - CARE PROVIDER_API CALL
Hemanth Torres)  Surgery  [address], [address] [place] [address]  Phone: [phone]  Fax: [phone]  Follow Up Time:     Sixto Little)  [facility]  [address] [place] [address]  Phone: [phone]  Fax: [phone]  Established Patient  Follow Up Time:

## 2020-01-17 NOTE — PROGRESS NOTE ADULT - SUBJECTIVE AND OBJECTIVE BOX
Department of Cardiology                                                                  [facility]/[place] [address] [address]                                                            Telephone: [phone]. Fax:[phone]                                                                                   Pre Peripheral Note    84y Male    Planned Procedure: BLE angiogram    HPI: 85 yo male with pmhx PAD, HTN, CAD, DM, HLD who presents for LE peripheral angio. He has c/o claudication and sob. Denies chest pain, palps.     Prior Studies:  DE 11/2019: demonstrated moderate to severe reduction in DE on the left with a ratio of 0.43. Normal on the right and 0.91.    Arterial doppler 11/2019: moderate to severe atherosclerosis throughout the lower extremities. Patent stent noted in the right mid SFA with no significant stenosis on the right side. Left proximal SFA with no significant stenosis on the right side. Left proximal SFA is occluded with reconstitution of the mid SFA and monophasic flow distally. Left PTA was not visualized.     Pertinent Prior Medications:       Antiplatelet: N/A       Aspirin: 81 mg daily       Statin: Lipitor 40 mg daily    PAST MEDICAL & SURGICAL HISTORY:  PAD (peripheral artery disease): s/p stent  High cholesterol  Diabetes  HTN (hypertension)  H/O craniotomy: subdural hematoma                  Assessment and Plan:   The patient was examined and interviewed by me today. There has been no change from the original history and physical except as noted above.    Problem List:   1. Claudication  BLE angiogram

## 2020-01-17 NOTE — DISCHARGE NOTE PROVIDER - NSDCCPCAREPLAN_GEN_ALL_CORE_FT
PRINCIPAL DISCHARGE DIAGNOSIS  Diagnosis: PAD (peripheral artery disease)  Assessment and Plan of Treatment: Vascular evaluation

## 2020-01-17 NOTE — DISCHARGE NOTE PROVIDER - HOSPITAL COURSE
83 yo male with pmhx PAD, HTN, CAD, DM, HLD who presents for LE peripheral angio. He has c/o claudication and sob. Denies chest pain, palpitations.     Prior Studies:    DE 11/2019: demonstrated moderate to severe reduction in DE on the left with a ratio of 0.43. Normal on the right and 0.91.    Arterial doppler 11/2019: moderate to severe atherosclerosis throughout the lower extremities. Patent stent noted in the right mid SFA with no significant stenosis on the right side.        He had a  lower extremity angiography as stated below.          Left Lower Extremity         Iliac: Patent stent         CFA: Distally occluded         SFA: Ostially occluded         Profunda: Ostially occluded         Popliteal: Patent         AT: Patent         PT: Severely diseased         Peroneal: Severely diseased        Right Lower Extremity         Iliac: Patent         CFA: 50% distal stenosis         SFA: Patent stent          Popliteal: Patent         AT: Patent         PT: Occluded         Peroneal: Occluded

## 2020-01-17 NOTE — DISCHARGE NOTE NURSING/CASE MANAGEMENT/SOCIAL WORK - PATIENT PORTAL LINK FT
You can access the FollowMyHealth Patient Portal offered by [facility] by registering at the following website: [email or web address]. By joining [employer]’s FollowMyHealth portal, you will also be able to view your health information using other applications (apps) compatible with our system.

## 2020-01-17 NOTE — PROGRESS NOTE ADULT - SUBJECTIVE AND OBJECTIVE BOX
Department of Cardiology                                                                  [facility]/[place] [address] [place] [address]                                                            Telephone: [phone]. Fax:[phone]                                                                                         PERIPHERAL NOTE     84yMale S/P lower extremity angiography as stated below.  The patient denies chest pain, SOB, leg/foot pain or groin pain.    Left Lower Extremity       Iliac: Patent stent       CFA: Distally occluded       SFA: Ostially occluded       Profunda: Ostially occluded       Popliteal: Patent       AT: Patent       PT: Severely diseased       Peroneal: Severely diseased    Right Lower Extremity       Iliac: Patent       CFA: 50% distal stenosis       SFA: Patent stent        Popliteal: Patent       AT: Patent       PT: Occluded       Peroneal: Occluded    HPI: 85 yo male with pmhx PAD, HTN, CAD, DM, HLD who presents for LE peripheral angio. He has c/o claudication and sob. Denies chest pain, palps.     Prior Studies:  DE 11/2019: demonstrated moderate to severe reduction in DE on the left with a ratio of 0.43. Normal on the right and 0.91.    Arterial doppler 11/2019: moderate to severe atherosclerosis throughout the lower extremities. Patent stent noted in the right mid SFA with no significant stenosis on the right side. Left proximal SFA with no significant stenosis on the right side. Left proximal SFA is occluded with reconstitution of the mid SFA and monophasic flow distally. Left PTA was not visualized.     General: Awake, alert, oriented, in no acute distress   Chest: CTA, S1, S2, RRR  Right Groin: Right femoral artery sheath, soft, no bleeding, no hematoma  Extremities: No edema

## 2020-01-17 NOTE — DISCHARGE NOTE PROVIDER - CARE PROVIDERS DIRECT ADDRESSES
[email or web address]

## 2020-01-17 NOTE — CONSULT NOTE ADULT - SUBJECTIVE AND OBJECTIVE BOX
Vascular Attending:  Dr Torres      HPI: 84yMale with h/o Bilat LE claudication L>R. Pt reports h/o 2 block claudication at times; exercises at gym daily with intermittent LLE complaints; denies any h/o rest pain or tissue loss. Pt is S/P lower extremity angiography today by Dr Benites with results below. Vasuclar surgery consulted for future L CF endarterectomy and possible bypass.  The patient denies chest pain, SOB, leg/foot pain or groin pain.    Left Lower Extremity       Iliac: Patent stent       CFA: Distally occluded       SFA: Ostially occluded       Profunda: Ostially occluded       Popliteal: Patent       AT: Patent       PT: Severely diseased       Peroneal: Severely diseased    Right Lower Extremity       Iliac: Patent       CFA: 50% distal stenosis       SFA: Patent stent        Popliteal: Patent       AT: Patent       PT: Occluded       Peroneal: Occluded        PAST MEDICAL & SURGICAL HISTORY:  PAD (peripheral artery disease): s/p stent  High cholesterol  Diabetes  HTN (hypertension)  H/O craniotomy: subdural hematoma      REVIEW OF SYSTEMS-as above all other ROS neg    MEDICATIONS  (STANDING):  atorvastatin 40 mg oral tablet: Last Dose Taken:  , 1 tab(s) orally once a day (at bedtime)  · 	tamsulosin 0.4 mg oral capsule: Last Dose Taken:  , 1 cap(s) orally once a day  · 	glipiZIDE: Last Dose Taken:  , 20 milligram(s) orally once a day  · 	aspirin 81 mg oral tablet: Last Dose Taken:  , 1 tab(s) orally once a day  · 	Multiple Vitamins oral tablet: Last Dose Taken:  , 1 tab(s) orally once a day  · 	Fish Oil 1200 mg oral capsule: Last Dose Taken:  , 1 cap(s) orally once a day  · 	ginko: Last Dose Taken:  , 1 cap(s) orally once a day  · 	NovoLOG Mix 70/30 FlexPen subcutaneous suspension: 23 unit(s) subcutaneous once a day in am  · 	NovoLOG FlexPen 100 units/mL injectable solution: 17 unit(s) injectable once a day at 1830 pm with dinner  · 	telmisartan-hydrochlorothiazide 80mg-12.5mg oral tablet: 1 tab(s) orally once a day  · 	Coenzyme Q10 100 mg oral capsule: Last Dose Taken:  , 1 cap(s) orally once a day  · 	Saw Palmetto oral capsule: 58 milligram(s) orally once a day  · 	metoprolol tartrate 50 mg oral tablet: Last Dose Taken:  , 1 tab(s) orally 2 times a day  MEDICATIONS  (PRN):      Allergies  No Known Allergies    SOCIAL HISTORY: , former smoker quit 1992      Vital Signs Last 24 Hrs  T(C): 36.6 (17 Jan 2020 07:39), Max: 36.6 (17 Jan 2020 07:39)  T(F): 97.8 (17 Jan 2020 07:39), Max: 97.8 (17 Jan 2020 07:39)  HR: 74 (17 Jan 2020 12:20) (61 - 75)  BP: 115/71 (17 Jan 2020 12:20) (115/71 - 164/77)  BP(mean): --  RR: 18 (17 Jan 2020 12:20) (12 - 18)  SpO2: 98% (17 Jan 2020 12:20) (98% - 100%)    PHYSICAL EXAM:  Constitutional: WD, WN M in NAD  Eyes: PERRL  ENMT: WNL  Neck: No JVD, + bilat referred murmurs vs bruits  Respiratory: CTA  Cardiovascular: normal S1, S2 3/6 KENNY  Gastrointestinal: soft, ND, NT, + BS  Extremities: No e/c/c  Neurological: A&O x 3  Pulses:   Right:                                                                          Left:  FEM [x ]2+ [ ]1+ [ ]doppler                                             FEM [x ]2+ [ ]1+ [ ]doppler    POP [x ]2+ [ ]1+ [ ]doppler                                             POP [ ]2+ [ ]1+ [ ]doppler    DP [ ]2+ [ ]1+ [x ]doppler                                                DP [ ]2+ [ ]1+ [ ]doppler  PT[ ]2+ [ ]1+ [x ]doppler                                                  PT [ ]2+ [ ]1+ [ ]doppler      LABS:                RADIOLOGY & ADDITIONAL STUDIES    Impression and Plan: 4 yo male with pmhx PAD, HTN, CAD, DM, HLD who presents for LE peripheral angio. He has c/o claudication with occluded CF, SFA and profunda  Pt to continue antiplatelet and statin therapy  Follow up with Dr Torres as outpt to discuss L CF endarterectomy as outpt

## 2020-01-17 NOTE — DISCHARGE NOTE PROVIDER - INSTRUCTIONS
Choose lean meats and poultry without skin and prepare them without added saturated and trans fat. Eat fish at least twice a week.  Select fat-free, 1 percent fat and low-fat dairy products.  Cut back on foods containing partially hydrogenated vegetable oils to reduce trans fat in your diet. To lower cholesterol, reduce saturated fat to no more than 5 to 6 percent of total calories. For someone eating 2,000 calories a day, that’s about 13 grams of saturated fat.  Cut back on beverages and foods with added sugars.  Choose and prepare foods with little or no salt. To lower blood pressure, aim to eat no more than 2,400 milligrams of sodium per day. Reducing daily intake to 1,500 mg is desirable because it can lower blood pressure even further.  If you drink alcohol, drink in moderation. That means one drink per day if you’re a woman and two drinks  per day if you’re a man.

## 2020-01-20 ENCOUNTER — RX RENEWAL (OUTPATIENT)
Age: 85
End: 2020-01-20

## 2020-01-20 RX ORDER — LEVOCETIRIZINE DIHYDROCHLORIDE 5 MG/1
5 TABLET ORAL DAILY
Qty: 90 | Refills: 0 | Status: ACTIVE | COMMUNITY
Start: 2019-10-21 | End: 1900-01-01

## 2020-01-21 PROBLEM — I73.9 PERIPHERAL VASCULAR DISEASE, UNSPECIFIED: Chronic | Status: ACTIVE | Noted: 2020-01-09

## 2020-01-30 ENCOUNTER — APPOINTMENT (OUTPATIENT)
Dept: VASCULAR SURGERY | Facility: CLINIC | Age: 85
End: 2020-01-30
Payer: MEDICARE

## 2020-01-30 VITALS
WEIGHT: 195 LBS | OXYGEN SATURATION: 97 % | BODY MASS INDEX: 28.88 KG/M2 | TEMPERATURE: 97.6 F | DIASTOLIC BLOOD PRESSURE: 77 MMHG | HEIGHT: 69 IN | SYSTOLIC BLOOD PRESSURE: 151 MMHG | HEART RATE: 66 BPM | RESPIRATION RATE: 16 BRPM

## 2020-01-30 PROCEDURE — 99203 OFFICE O/P NEW LOW 30 MIN: CPT

## 2020-01-30 NOTE — HISTORY OF PRESENT ILLNESS
[FreeTextEntry1] : 84 year old man with left leg claudication presents for evaluation.\par He states that symptoms begin after walking 2 blocks and interfere with his usual activities like moving the lawn and running errands. Pain is usually in his calf. he denies rest pain, gangrene or foot ulcers.\par he has a history of hypertension and CAD.\par of note, patient had a LLE angiogram 2 weeks ago that showed occlusion of the distal common femoral artery, proximal SFA and proximal deep femoral artery. Based on the nature of his occlusive disease, he was deemed by his interventional cardiologist not to be a candidate for percutaneous revascularization

## 2020-01-30 NOTE — PHYSICAL EXAM
[Carotid Bruits] : no carotid bruits [Normal Breath Sounds] : Normal breath sounds [Abdominal Aorta] : Normal abdominal aorta [1+] : right 1+ [2+] : right 2+ [Ankle Swelling (On Exam)] : not present [0] : left 0 [Varicose Veins Of Lower Extremities] : not present [] : not present [Abdomen Tenderness] : ~T ~M No abdominal tenderness [Abdomen Masses] : No abdominal masses [No Rash or Lesion] : No rash or lesion [Abdominal Bruit] : no abdominal bruit  [Alert] : alert [Oriented to Person] : oriented to person [Oriented to Place] : oriented to place [Calm] : calm [Oriented to Time] : oriented to time [FreeTextEntry1] : Brisk capillary refill b/l\par No gangrene [de-identified] : Well appearing

## 2020-01-30 NOTE — ASSESSMENT
[FreeTextEntry1] : 84 year old male with lifestyle limiting left leg claudication due to occlusion of his CFA, SFA and profunda.\par I have explained to him that he does not have limb-threatening ischemia, but due to the severity of his symptoms he will benefit from a left femoral endarterectomy.\par I discussed the risks and benefits of the procedure.\par Patient will like to think further before deciding on whether to proceed.\par All questions answered\par

## 2020-02-04 RX ORDER — GLIPIZIDE 5 MG/1
5 TABLET, FILM COATED, EXTENDED RELEASE ORAL DAILY
Qty: 90 | Refills: 1 | Status: ACTIVE | COMMUNITY
Start: 2019-03-01 | End: 1900-01-01

## 2020-02-10 ENCOUNTER — APPOINTMENT (OUTPATIENT)
Dept: INTERNAL MEDICINE | Facility: CLINIC | Age: 85
End: 2020-02-10
Payer: MEDICARE

## 2020-02-10 VITALS
HEIGHT: 69 IN | TEMPERATURE: 97.6 F | DIASTOLIC BLOOD PRESSURE: 82 MMHG | WEIGHT: 193.5 LBS | SYSTOLIC BLOOD PRESSURE: 142 MMHG | HEART RATE: 67 BPM | BODY MASS INDEX: 28.66 KG/M2 | OXYGEN SATURATION: 97 %

## 2020-02-10 DIAGNOSIS — R05 COUGH: ICD-10-CM

## 2020-02-10 DIAGNOSIS — R09.82 POSTNASAL DRIP: ICD-10-CM

## 2020-02-10 PROCEDURE — 99214 OFFICE O/P EST MOD 30 MIN: CPT | Mod: 25

## 2020-02-10 PROCEDURE — 10060 I&D ABSCESS SIMPLE/SINGLE: CPT

## 2020-02-10 RX ORDER — ALBUTEROL SULFATE 90 UG/1
108 (90 BASE) INHALANT RESPIRATORY (INHALATION)
Qty: 7 | Refills: 0 | Status: DISCONTINUED | COMMUNITY
Start: 2019-09-17

## 2020-02-10 RX ORDER — AMLODIPINE BESYLATE 10 MG/1
10 TABLET ORAL DAILY
Qty: 90 | Refills: 0 | Status: DISCONTINUED | COMMUNITY
Start: 2019-03-03 | End: 2020-02-10

## 2020-02-10 RX ORDER — FLUTICASONE PROPIONATE 50 UG/1
50 SPRAY, METERED NASAL TWICE DAILY
Qty: 1 | Refills: 1 | Status: ACTIVE | COMMUNITY
Start: 2020-02-10 | End: 1900-01-01

## 2020-02-10 RX ORDER — HYDROCODONE BITARTRATE AND HOMATROPINE METHYLBROMIDE 5; 1.5 MG/5ML; MG/5ML
5-1.5 SYRUP ORAL
Qty: 100 | Refills: 0 | Status: DISCONTINUED | COMMUNITY
Start: 2019-09-17

## 2020-02-10 NOTE — HISTORY OF PRESENT ILLNESS
[de-identified] : Mr. LIBERTAD QUARLES is a 84-year-old male with a past medical history of CAD, PAD, chronic claudication, hypertension, diabetes comes to the office c/o dry cough and congestion x 6 weeks. Patient complaints of "boil" on right buttock no other complaints at this time  [FreeTextEntry1] : cough / still not feeling better

## 2020-02-10 NOTE — PHYSICAL EXAM
[No Acute Distress] : no acute distress [Well Nourished] : well nourished [Well-Appearing] : well-appearing [Well Developed] : well developed [PERRL] : pupils equal round and reactive to light [EOMI] : extraocular movements intact [Normal Sclera/Conjunctiva] : normal sclera/conjunctiva [No JVD] : no jugular venous distention [Supple] : supple [Normal Outer Ear/Nose] : the outer ears and nose were normal in appearance [Thyroid Normal, No Nodules] : the thyroid was normal and there were no nodules present [No Lymphadenopathy] : no lymphadenopathy [No Accessory Muscle Use] : no accessory muscle use [No Respiratory Distress] : no respiratory distress  [Normal Rate] : normal rate  [No Carotid Bruits] : no carotid bruits [Normal S1, S2] : normal S1 and S2 [Regular Rhythm] : with a regular rhythm [No Abdominal Bruit] : a ~M bruit was not heard ~T in the abdomen [Pedal Pulses Present] : the pedal pulses are present [No Varicosities] : no varicosities [No Edema] : there was no peripheral edema [No Palpable Aorta] : no palpable aorta [No Extremity Clubbing/Cyanosis] : no extremity clubbing/cyanosis [Non-distended] : non-distended [Soft] : abdomen soft [Non Tender] : non-tender [No Masses] : no abdominal mass palpated [No HSM] : no HSM [Normal Bowel Sounds] : normal bowel sounds [Normal Posterior Cervical Nodes] : no posterior cervical lymphadenopathy [Normal Anterior Cervical Nodes] : no anterior cervical lymphadenopathy [No CVA Tenderness] : no CVA  tenderness [Grossly Normal Strength/Tone] : grossly normal strength/tone [No Spinal Tenderness] : no spinal tenderness [No Joint Swelling] : no joint swelling [No Rash] : no rash [Normal Gait] : normal gait [Coordination Grossly Intact] : coordination grossly intact [No Focal Deficits] : no focal deficits [Normal Insight/Judgement] : insight and judgment were intact [Normal Affect] : the affect was normal [de-identified] : 2 cm diameter abscess noted on lower part of right gluteal region.  [de-identified] : fullness of TM bilaterally, cobblestoning of pharynx

## 2020-02-10 NOTE — PLAN
[FreeTextEntry1] : Patient's signs and symptoms consistent with allergic rhinitis. Patient educated about signs and symptoms of allergic rhinitis. Patient advised to continue OTC medication for symptomatic relief.\par Patient was also referred to ENT \par \par in regards to patient's chronic cough, likely post nasal drip will prescribe intranasal steroid \par \par In regards to gluteal abscess, I& D performed in office, area prepped with Iodine,  incision with 11 blade, 3-5 cc purulent fluid drained from abscess. Culture obtained.  Patient given 1 week antibiotics.  \par \par patient's BP well controlled with current medication. will continue current  regimen \par \par Patient will return in office in 1 week for follow up of chronic medical conditions. \par \par Counseling included abnormal lab results, differential diagnoses, treatment options, risks and benefits, lifestyle changes, prognosis, current condition, medications, and dose adjustments. \par The patient was interactive, attentive, asked questions, and verbalized understanding

## 2020-02-10 NOTE — REVIEW OF SYSTEMS
[Postnasal Drip] : postnasal drip [Nasal Discharge] : nasal discharge [Sore Throat] : sore throat [Cough] : cough [Negative] : Psychiatric

## 2020-02-17 ENCOUNTER — APPOINTMENT (OUTPATIENT)
Dept: INTERNAL MEDICINE | Facility: CLINIC | Age: 85
End: 2020-02-17
Payer: MEDICARE

## 2020-02-17 VITALS
BODY MASS INDEX: 28.58 KG/M2 | WEIGHT: 193 LBS | DIASTOLIC BLOOD PRESSURE: 74 MMHG | HEIGHT: 69 IN | SYSTOLIC BLOOD PRESSURE: 128 MMHG

## 2020-02-17 DIAGNOSIS — N18.9 CHRONIC KIDNEY DISEASE, UNSPECIFIED: ICD-10-CM

## 2020-02-17 DIAGNOSIS — E11.9 TYPE 2 DIABETES MELLITUS W/OUT COMPLICATIONS: ICD-10-CM

## 2020-02-17 DIAGNOSIS — L02.31 CUTANEOUS ABSCESS OF BUTTOCK: ICD-10-CM

## 2020-02-17 LAB — BACTERIA WND CULT: ABNORMAL

## 2020-02-17 PROCEDURE — 99214 OFFICE O/P EST MOD 30 MIN: CPT | Mod: 24

## 2020-02-17 PROCEDURE — 36415 COLL VENOUS BLD VENIPUNCTURE: CPT

## 2020-02-17 NOTE — PLAN
[FreeTextEntry1] : In regards to gluteal abscess, resolved with ABX\par \par patient's BP well controlled with current medication. will continue current  regimen \par \par In regards to DM, will retest HBA1C today.\par \par Counseling included abnormal lab results, differential diagnoses, treatment options, risks and benefits, lifestyle changes, prognosis, current condition, medications, and dose adjustments. \par The patient was interactive, attentive, asked questions, and verbalized understanding

## 2020-02-17 NOTE — HISTORY OF PRESENT ILLNESS
[FreeTextEntry1] : follow up on medical issues [de-identified] : Mr. LIBERTAD QUARLES is a 84-year-old male with a past medical history of CAD, PAD, chronic claudication, hypertension, diabetes comes to the office for follow up of chronic medical conditions and for follow up of abscess of glute. Patient feels well and has no complaints at this time.

## 2020-02-17 NOTE — PHYSICAL EXAM
[No Acute Distress] : no acute distress [Well Nourished] : well nourished [Well-Appearing] : well-appearing [Well Developed] : well developed [PERRL] : pupils equal round and reactive to light [Normal Outer Ear/Nose] : the outer ears and nose were normal in appearance [Normal Sclera/Conjunctiva] : normal sclera/conjunctiva [EOMI] : extraocular movements intact [No JVD] : no jugular venous distention [Normal Oropharynx] : the oropharynx was normal [No Lymphadenopathy] : no lymphadenopathy [Thyroid Normal, No Nodules] : the thyroid was normal and there were no nodules present [Supple] : supple [Clear to Auscultation] : lungs were clear to auscultation bilaterally [No Respiratory Distress] : no respiratory distress  [No Accessory Muscle Use] : no accessory muscle use [Normal Rate] : normal rate  [Normal S1, S2] : normal S1 and S2 [Regular Rhythm] : with a regular rhythm [No Murmur] : no murmur heard [No Varicosities] : no varicosities [No Carotid Bruits] : no carotid bruits [No Abdominal Bruit] : a ~M bruit was not heard ~T in the abdomen [No Palpable Aorta] : no palpable aorta [No Edema] : there was no peripheral edema [Pedal Pulses Present] : the pedal pulses are present [Soft] : abdomen soft [Non-distended] : non-distended [Non Tender] : non-tender [No Extremity Clubbing/Cyanosis] : no extremity clubbing/cyanosis [Normal Bowel Sounds] : normal bowel sounds [No Masses] : no abdominal mass palpated [No HSM] : no HSM [Normal Posterior Cervical Nodes] : no posterior cervical lymphadenopathy [No CVA Tenderness] : no CVA  tenderness [Normal Anterior Cervical Nodes] : no anterior cervical lymphadenopathy [No Spinal Tenderness] : no spinal tenderness [No Joint Swelling] : no joint swelling [Grossly Normal Strength/Tone] : grossly normal strength/tone [No Rash] : no rash [Coordination Grossly Intact] : coordination grossly intact [No Focal Deficits] : no focal deficits [Normal Insight/Judgement] : insight and judgment were intact [Normal Affect] : the affect was normal [Normal Gait] : normal gait

## 2020-02-18 LAB
ALBUMIN SERPL ELPH-MCNC: 3.8 G/DL
ALP BLD-CCNC: 58 U/L
ALT SERPL-CCNC: 13 U/L
ANION GAP SERPL CALC-SCNC: 12 MMOL/L
AST SERPL-CCNC: 19 U/L
BASOPHILS # BLD AUTO: 0.04 K/UL
BASOPHILS NFR BLD AUTO: 1 %
BILIRUB SERPL-MCNC: 0.5 MG/DL
BUN SERPL-MCNC: 22 MG/DL
CALCIUM SERPL-MCNC: 9.6 MG/DL
CHLORIDE SERPL-SCNC: 100 MMOL/L
CO2 SERPL-SCNC: 29 MMOL/L
CREAT SERPL-MCNC: 1.56 MG/DL
EOSINOPHIL # BLD AUTO: 0.12 K/UL
EOSINOPHIL NFR BLD AUTO: 2.9 %
ESTIMATED AVERAGE GLUCOSE: 186 MG/DL
GLUCOSE SERPL-MCNC: 182 MG/DL
HBA1C MFR BLD HPLC: 8.1 %
HCT VFR BLD CALC: 49.8 %
HGB BLD-MCNC: 15.3 G/DL
IMM GRANULOCYTES NFR BLD AUTO: 0.5 %
LYMPHOCYTES # BLD AUTO: 1.71 K/UL
LYMPHOCYTES NFR BLD AUTO: 41.4 %
MAN DIFF?: NORMAL
MCHC RBC-ENTMCNC: 26.3 PG
MCHC RBC-ENTMCNC: 30.7 GM/DL
MCV RBC AUTO: 85.6 FL
MONOCYTES # BLD AUTO: 0.48 K/UL
MONOCYTES NFR BLD AUTO: 11.6 %
NEUTROPHILS # BLD AUTO: 1.76 K/UL
NEUTROPHILS NFR BLD AUTO: 42.6 %
PLATELET # BLD AUTO: 156 K/UL
POTASSIUM SERPL-SCNC: 4.4 MMOL/L
PROT SERPL-MCNC: 6.6 G/DL
RBC # BLD: 5.82 M/UL
RBC # FLD: 15.6 %
SODIUM SERPL-SCNC: 142 MMOL/L
WBC # FLD AUTO: 4.13 K/UL

## 2020-02-18 RX ORDER — INSULIN ASPART 100 [IU]/ML
(70-30) 100 INJECTION, SUSPENSION SUBCUTANEOUS DAILY
Qty: 3 | Refills: 1 | Status: ACTIVE | COMMUNITY
Start: 1900-01-01 | End: 1900-01-01

## 2020-02-25 ENCOUNTER — APPOINTMENT (OUTPATIENT)
Dept: CARDIOLOGY | Facility: CLINIC | Age: 85
End: 2020-02-25
Payer: MEDICARE

## 2020-02-25 ENCOUNTER — NON-APPOINTMENT (OUTPATIENT)
Age: 85
End: 2020-02-25

## 2020-02-25 VITALS
HEART RATE: 66 BPM | WEIGHT: 194 LBS | DIASTOLIC BLOOD PRESSURE: 77 MMHG | HEIGHT: 69 IN | RESPIRATION RATE: 16 BRPM | SYSTOLIC BLOOD PRESSURE: 154 MMHG | BODY MASS INDEX: 28.73 KG/M2

## 2020-02-25 DIAGNOSIS — Z82.49 FAMILY HISTORY OF ISCHEMIC HEART DISEASE AND OTHER DISEASES OF THE CIRCULATORY SYSTEM: ICD-10-CM

## 2020-02-25 DIAGNOSIS — I25.10 ATHEROSCLEROTIC HEART DISEASE OF NATIVE CORONARY ARTERY W/OUT ANGINA PECTORIS: ICD-10-CM

## 2020-02-25 DIAGNOSIS — Z83.42 FAMILY HISTORY OF FAMILIAL HYPERCHOLESTEROLEMIA: ICD-10-CM

## 2020-02-25 DIAGNOSIS — I73.9 PERIPHERAL VASCULAR DISEASE, UNSPECIFIED: ICD-10-CM

## 2020-02-25 DIAGNOSIS — I10 ESSENTIAL (PRIMARY) HYPERTENSION: ICD-10-CM

## 2020-02-25 DIAGNOSIS — I35.0 NONRHEUMATIC AORTIC (VALVE) STENOSIS: ICD-10-CM

## 2020-02-25 DIAGNOSIS — Z83.3 FAMILY HISTORY OF DIABETES MELLITUS: ICD-10-CM

## 2020-02-25 PROCEDURE — 99214 OFFICE O/P EST MOD 30 MIN: CPT

## 2020-02-25 PROCEDURE — 93000 ELECTROCARDIOGRAM COMPLETE: CPT

## 2020-02-25 RX ORDER — VITAMIN E 268 MG
CAPSULE ORAL
Refills: 0 | Status: ACTIVE | COMMUNITY

## 2020-02-25 RX ORDER — NIFEDIPINE 30 MG/1
30 TABLET, FILM COATED, EXTENDED RELEASE ORAL
Qty: 90 | Refills: 1 | Status: ACTIVE | COMMUNITY
Start: 2020-02-25 | End: 1900-01-01

## 2020-02-25 RX ORDER — AMOXICILLIN AND CLAVULANATE POTASSIUM 875; 125 MG/1; MG/1
875-125 TABLET, COATED ORAL TWICE DAILY
Qty: 14 | Refills: 0 | Status: DISCONTINUED | COMMUNITY
Start: 2020-02-10 | End: 2020-02-25
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
## 2020-02-25 NOTE — PHYSICAL EXAM
[Normal Conjunctiva] : the conjunctiva exhibited no abnormalities [General Appearance - In No Acute Distress] : no acute distress [Normal Oral Mucosa] : normal oral mucosa [Auscultation Breath Sounds / Voice Sounds] : lungs were clear to auscultation bilaterally [Abdomen Soft] : soft [Abdomen Tenderness] : non-tender [Abnormal Walk] : normal gait [Nail Clubbing] : no clubbing of the fingernails [Cyanosis, Localized] : no localized cyanosis [Skin Color & Pigmentation] : normal skin color and pigmentation [Oriented To Time, Place, And Person] : oriented to person, place, and time [Affect] : the affect was normal [Normal Rate] : normal [Rhythm Regular] : regular [Normal S1] : normal S1 [Normal S2] : normal S2 [S4] : an S4 was heard [III] : a grade 3 [S3] : no S3 [FreeTextEntry1] : 1+ b/l distal pulses LE

## 2020-02-25 NOTE — DISCUSSION/SUMMARY
[FreeTextEntry1] : 1. No additional cardiac testing at this time. Echocardiogram prior to followup to reevaluate his aortic stenosis.\par 2. He will continue on Lipitor and I will attempt to get him Praluent.\par 3. Start nifedipine ER 30 mg daily for hypertension.\par 4. Continue other current cardiac meds in doses as noted above for CAD, PAD, hypertension.\par 5. Monitor BP at home, keep a log and bring to f/u.\par 6. Patient is encouraged to exercise at least 30 minutes a day everyday of the week. If he starts having pain in his legs he will continue to exercise for at least another 5-10 minutes.\par 7. Follow up with his primary doctor for treatment of diabetes.\par 8. Follow up here in 2 months.

## 2020-02-25 NOTE — ASSESSMENT
[FreeTextEntry1] : Echocardiogram April 5, 2019 demonstrated left ventricle normal size and function with ejection fraction of 65-70%. Mild LVH noted with elevated left atrial pressure. There was noted to be severe aortic stenosis with mild aortic insufficiency. Mild mitral to cuspid regurgitation noted mild pulmonary hypertension noted. Also a moderate-sized pericardial effusion is noted without evidence of tamponade.\par \par Carotid Doppler April 5, 2019 demonstrated mild to moderate plaque bilaterally with no significant stenosis.\par \par Abdominal aortic Doppler March 5, 2019 showed no evidence of aneurysm.\par \par Nuclear stress test February 25, 2019 with a pharmacologic study tolerated well. EKG showed no evidence of ischemia. Nuclear imaging showed no evidence of ischemia or infarct and an ejection fraction of 52%.\par \par Echocardiogram June 25, 2019 showed a small to moderate circumferential pericardial effusion without evidence of tamponade. No significant change from prior.\par \par DE November 8, 2019 demonstrated moderate to severe reduction in DE on the left with a ratio of 0.43. Normal on the right and 0.91.\par \par Lower extremity arterial Doppler November 6, 2019 demonstrated moderate to severe atherosclerosis throughout the lower extremities. Patent stent noted in the right mid SFA with no significant stenosis on the right side. Left proximal SFA is occluded with reconstitution of the mid SFA and monophasic flow distally. Left PTA was not visualized.\par \par EKG: Sinus rhythm with intraventricular conduction delay. Nonspecific ST and T wave changes in the anterolateral leads not significantly changed from prior.\par \par 84-year-old male with a past medical history of CAD, PAD, chronic claudication, hypertension, diabetes who presents for followup. Patient had his lower extremity angiogram demonstrating severe left-sided stenosis. He followed up with surgery but has ultimately decided on no intervention. We discussed the importance of exercise to attempt to increase collaterals and improve his symptoms. There is no evidence of acute limb ischemia at this time. Blood pressure also needs to be better controlled. Additionally his LDL is around 100 and I recommended increasing statin but he is very concerned regarding cramps in his legs. I will attempt to get him a PCSK9 inhibitor.  He will followup with his primary for treatment of his diabetes as well.

## 2020-03-03 ENCOUNTER — RX RENEWAL (OUTPATIENT)
Age: 85
End: 2020-03-03

## 2020-03-03 RX ORDER — LANCETS 28 GAUGE
EACH MISCELLANEOUS
Qty: 3 | Refills: 1 | Status: ACTIVE | COMMUNITY
Start: 2018-04-26 | End: 1900-01-01

## 2020-04-13 ENCOUNTER — APPOINTMENT (OUTPATIENT)
Dept: CARDIOLOGY | Facility: CLINIC | Age: 85
End: 2020-04-13

## 2020-04-13 ENCOUNTER — INPATIENT (INPATIENT)
Facility: HOSPITAL | Age: 85
LOS: 1 days | DRG: 871 | End: 2020-04-15
Attending: INTERNAL MEDICINE | Admitting: INTERNAL MEDICINE
Payer: MEDICARE

## 2020-04-13 VITALS — WEIGHT: 199.96 LBS | HEIGHT: 70 IN

## 2020-04-13 DIAGNOSIS — J96.91 RESPIRATORY FAILURE, UNSPECIFIED WITH HYPOXIA: ICD-10-CM

## 2020-04-13 DIAGNOSIS — Z98.89 OTHER SPECIFIED POSTPROCEDURAL STATES: Chronic | ICD-10-CM

## 2020-04-13 LAB
ALBUMIN SERPL ELPH-MCNC: 2.8 G/DL — LOW (ref 3.3–5.2)
ALBUMIN SERPL ELPH-MCNC: 3.1 G/DL — LOW (ref 3.3–5.2)
ALP SERPL-CCNC: 110 U/L — SIGNIFICANT CHANGE UP (ref 40–120)
ALP SERPL-CCNC: 116 U/L — SIGNIFICANT CHANGE UP (ref 40–120)
ALP SERPL-CCNC: 118 U/L — SIGNIFICANT CHANGE UP (ref 40–120)
ALP SERPL-CCNC: 85 U/L — SIGNIFICANT CHANGE UP (ref 40–120)
ALT FLD-CCNC: 37 U/L — SIGNIFICANT CHANGE UP
ALT FLD-CCNC: 41 U/L — HIGH
ALT FLD-CCNC: 54 U/L — HIGH
ALT FLD-CCNC: 60 U/L — HIGH
ANION GAP SERPL CALC-SCNC: 19 MMOL/L — HIGH (ref 5–17)
ANION GAP SERPL CALC-SCNC: 24 MMOL/L — HIGH (ref 5–17)
ANION GAP SERPL CALC-SCNC: 29 MMOL/L — HIGH (ref 5–17)
ANION GAP SERPL CALC-SCNC: 29 MMOL/L — HIGH (ref 5–17)
ANISOCYTOSIS BLD QL: SLIGHT — SIGNIFICANT CHANGE UP
AST SERPL-CCNC: 121 U/L — HIGH
AST SERPL-CCNC: 155 U/L — HIGH
AST SERPL-CCNC: 167 U/L — HIGH
AST SERPL-CCNC: 83 U/L — HIGH
BASE EXCESS BLDA CALC-SCNC: -9.7 MMOL/L — LOW (ref -2–2)
BASE EXCESS BLDA CALC-SCNC: 0.2 MMOL/L — SIGNIFICANT CHANGE UP (ref -3–3)
BASE EXCESS BLDV CALC-SCNC: -14.3 MMOL/L — LOW (ref -2–2)
BASOPHILS # BLD AUTO: 0 K/UL — SIGNIFICANT CHANGE UP (ref 0–0.2)
BASOPHILS # BLD AUTO: 0.1 K/UL — SIGNIFICANT CHANGE UP (ref 0–0.2)
BASOPHILS NFR BLD AUTO: 0 % — SIGNIFICANT CHANGE UP (ref 0–2)
BASOPHILS NFR BLD AUTO: 0.6 % — SIGNIFICANT CHANGE UP (ref 0–2)
BILIRUB SERPL-MCNC: 0.5 MG/DL — SIGNIFICANT CHANGE UP (ref 0.4–2)
BILIRUB SERPL-MCNC: 0.7 MG/DL — SIGNIFICANT CHANGE UP (ref 0.4–2)
BILIRUB SERPL-MCNC: 1.3 MG/DL — SIGNIFICANT CHANGE UP (ref 0.4–2)
BILIRUB SERPL-MCNC: 1.5 MG/DL — SIGNIFICANT CHANGE UP (ref 0.4–2)
BLASTS # FLD: 1.8 % — HIGH (ref 0–0)
BLOOD GAS COMMENTS ARTERIAL: SIGNIFICANT CHANGE UP
BLOOD GAS COMMENTS ARTERIAL: SIGNIFICANT CHANGE UP
BUN SERPL-MCNC: 65 MG/DL — HIGH (ref 8–20)
BUN SERPL-MCNC: 72 MG/DL — HIGH (ref 8–20)
BUN SERPL-MCNC: 80 MG/DL — HIGH (ref 8–20)
BUN SERPL-MCNC: 82 MG/DL — HIGH (ref 8–20)
CA-I SERPL-SCNC: 0.93 MMOL/L — LOW (ref 1.15–1.33)
CALCIUM SERPL-MCNC: 7.5 MG/DL — LOW (ref 8.6–10.2)
CALCIUM SERPL-MCNC: 7.6 MG/DL — LOW (ref 8.6–10.2)
CALCIUM SERPL-MCNC: 8 MG/DL — LOW (ref 8.6–10.2)
CALCIUM SERPL-MCNC: 8 MG/DL — LOW (ref 8.6–10.2)
CHLORIDE BLDV-SCNC: 92 MMOL/L — LOW (ref 98–107)
CHLORIDE SERPL-SCNC: 84 MMOL/L — LOW (ref 98–107)
CHLORIDE SERPL-SCNC: 86 MMOL/L — LOW (ref 98–107)
CHLORIDE SERPL-SCNC: 86 MMOL/L — LOW (ref 98–107)
CHLORIDE SERPL-SCNC: 89 MMOL/L — LOW (ref 98–107)
CO2 SERPL-SCNC: 12 MMOL/L — LOW (ref 22–29)
CO2 SERPL-SCNC: 19 MMOL/L — LOW (ref 22–29)
CO2 SERPL-SCNC: 21 MMOL/L — LOW (ref 22–29)
CO2 SERPL-SCNC: 22 MMOL/L — SIGNIFICANT CHANGE UP (ref 22–29)
CREAT SERPL-MCNC: 2.44 MG/DL — HIGH (ref 0.5–1.3)
CREAT SERPL-MCNC: 3.13 MG/DL — HIGH (ref 0.5–1.3)
CREAT SERPL-MCNC: 3.49 MG/DL — HIGH (ref 0.5–1.3)
CREAT SERPL-MCNC: 5.05 MG/DL — HIGH (ref 0.5–1.3)
CRP SERPL-MCNC: 20.92 MG/DL — HIGH (ref 0–0.4)
D DIMER BLD IA.RAPID-MCNC: HIGH NG/ML DDU
ELLIPTOCYTES BLD QL SMEAR: SLIGHT — SIGNIFICANT CHANGE UP
EOSINOPHIL # BLD AUTO: 0 K/UL — SIGNIFICANT CHANGE UP (ref 0–0.5)
EOSINOPHIL # BLD AUTO: 0.04 K/UL — SIGNIFICANT CHANGE UP (ref 0–0.5)
EOSINOPHIL NFR BLD AUTO: 0 % — SIGNIFICANT CHANGE UP (ref 0–6)
EOSINOPHIL NFR BLD AUTO: 0.2 % — SIGNIFICANT CHANGE UP (ref 0–6)
ERYTHROCYTE [SEDIMENTATION RATE] IN BLOOD: 10 MM/HR — SIGNIFICANT CHANGE UP (ref 0–20)
FERRITIN SERPL-MCNC: 964 NG/ML — HIGH (ref 30–400)
GAS PNL BLDA: SIGNIFICANT CHANGE UP
GAS PNL BLDV: 130 MMOL/L — LOW (ref 135–145)
GAS PNL BLDV: SIGNIFICANT CHANGE UP
GAS PNL BLDV: SIGNIFICANT CHANGE UP
GIANT PLATELETS BLD QL SMEAR: PRESENT — SIGNIFICANT CHANGE UP
GLUCOSE BLDC GLUCOMTR-MCNC: 479 MG/DL — CRITICAL HIGH (ref 70–99)
GLUCOSE BLDC GLUCOMTR-MCNC: >530 MG/DL — CRITICAL HIGH (ref 70–99)
GLUCOSE BLDV-MCNC: 437 MG/DL — HIGH (ref 70–99)
GLUCOSE SERPL-MCNC: 461 MG/DL — HIGH (ref 70–99)
GLUCOSE SERPL-MCNC: 537 MG/DL — CRITICAL HIGH (ref 70–99)
GLUCOSE SERPL-MCNC: 643 MG/DL — CRITICAL HIGH (ref 70–99)
GLUCOSE SERPL-MCNC: 656 MG/DL — CRITICAL HIGH (ref 70–99)
HCO3 BLDA-SCNC: 17 MMOL/L — LOW (ref 20–26)
HCO3 BLDA-SCNC: 25 MMOL/L — SIGNIFICANT CHANGE UP (ref 20–26)
HCO3 BLDV-SCNC: 14 MMOL/L — LOW (ref 20–26)
HCT VFR BLD CALC: 41.9 % — SIGNIFICANT CHANGE UP (ref 39–50)
HCT VFR BLD CALC: 44.5 % — SIGNIFICANT CHANGE UP (ref 39–50)
HCT VFR BLD CALC: 46.1 % — SIGNIFICANT CHANGE UP (ref 39–50)
HCT VFR BLDA CALC: 47 — SIGNIFICANT CHANGE UP (ref 39–50)
HGB BLD CALC-MCNC: 15.4 G/DL — SIGNIFICANT CHANGE UP (ref 13–17)
HGB BLD-MCNC: 13.4 G/DL — SIGNIFICANT CHANGE UP (ref 13–17)
HGB BLD-MCNC: 14.4 G/DL — SIGNIFICANT CHANGE UP (ref 13–17)
HGB BLD-MCNC: 15.1 G/DL — SIGNIFICANT CHANGE UP (ref 13–17)
HOROWITZ INDEX BLDA+IHG-RTO: 100 — SIGNIFICANT CHANGE UP
HOROWITZ INDEX BLDA+IHG-RTO: SIGNIFICANT CHANGE UP
HYPOCHROMIA BLD QL: SLIGHT — SIGNIFICANT CHANGE UP
IMM GRANULOCYTES NFR BLD AUTO: 6.6 % — HIGH (ref 0–1.5)
LACTATE BLDV-MCNC: 10.7 MMOL/L — CRITICAL HIGH (ref 0.5–2)
LDH SERPL L TO P-CCNC: 805 U/L — HIGH (ref 98–192)
LYMPHOCYTES # BLD AUTO: 0.37 K/UL — LOW (ref 1–3.3)
LYMPHOCYTES # BLD AUTO: 1.7 % — LOW (ref 13–44)
LYMPHOCYTES # BLD AUTO: 16.6 % — SIGNIFICANT CHANGE UP (ref 13–44)
LYMPHOCYTES # BLD AUTO: 2.88 K/UL — SIGNIFICANT CHANGE UP (ref 1–3.3)
MACROCYTES BLD QL: SLIGHT — SIGNIFICANT CHANGE UP
MAGNESIUM SERPL-MCNC: 2.8 MG/DL — HIGH (ref 1.6–2.6)
MAGNESIUM SERPL-MCNC: 2.8 MG/DL — HIGH (ref 1.8–2.6)
MAGNESIUM SERPL-MCNC: 3 MG/DL — HIGH (ref 1.6–2.6)
MANUAL SMEAR VERIFICATION: SIGNIFICANT CHANGE UP
MCHC RBC-ENTMCNC: 26.1 PG — LOW (ref 27–34)
MCHC RBC-ENTMCNC: 26.7 PG — LOW (ref 27–34)
MCHC RBC-ENTMCNC: 27.3 PG — SIGNIFICANT CHANGE UP (ref 27–34)
MCHC RBC-ENTMCNC: 32 GM/DL — SIGNIFICANT CHANGE UP (ref 32–36)
MCHC RBC-ENTMCNC: 32.4 GM/DL — SIGNIFICANT CHANGE UP (ref 32–36)
MCHC RBC-ENTMCNC: 32.8 GM/DL — SIGNIFICANT CHANGE UP (ref 32–36)
MCV RBC AUTO: 81.5 FL — SIGNIFICANT CHANGE UP (ref 80–100)
MCV RBC AUTO: 82.6 FL — SIGNIFICANT CHANGE UP (ref 80–100)
MCV RBC AUTO: 83.2 FL — SIGNIFICANT CHANGE UP (ref 80–100)
METAMYELOCYTES # FLD: 1.8 % — HIGH (ref 0–0)
MICROCYTES BLD QL: SLIGHT — SIGNIFICANT CHANGE UP
MONOCYTES # BLD AUTO: 0.37 K/UL — SIGNIFICANT CHANGE UP (ref 0–0.9)
MONOCYTES # BLD AUTO: 0.8 K/UL — SIGNIFICANT CHANGE UP (ref 0–0.9)
MONOCYTES NFR BLD AUTO: 1.7 % — LOW (ref 2–14)
MONOCYTES NFR BLD AUTO: 4.6 % — SIGNIFICANT CHANGE UP (ref 2–14)
NEUTROPHILS # BLD AUTO: 12.39 K/UL — HIGH (ref 1.8–7.4)
NEUTROPHILS # BLD AUTO: 20.26 K/UL — HIGH (ref 1.8–7.4)
NEUTROPHILS NFR BLD AUTO: 71.4 % — SIGNIFICANT CHANGE UP (ref 43–77)
NEUTROPHILS NFR BLD AUTO: 93 % — HIGH (ref 43–77)
NT-PROBNP SERPL-SCNC: 9346 PG/ML — HIGH (ref 0–300)
OTHER CELLS CSF MANUAL: 17 ML/DL — LOW (ref 18–22)
OVALOCYTES BLD QL SMEAR: SLIGHT — SIGNIFICANT CHANGE UP
PCO2 BLDA: 71 MMHG — CRITICAL HIGH (ref 35–45)
PCO2 BLDA: 80 MMHG — CRITICAL HIGH (ref 35–45)
PCO2 BLDV: 44 MMHG — SIGNIFICANT CHANGE UP (ref 35–50)
PH BLDA: 7.06 — CRITICAL LOW (ref 7.35–7.45)
PH BLDA: 7.23 — LOW (ref 7.35–7.45)
PH BLDV: 7.13 — CRITICAL LOW (ref 7.32–7.43)
PHOSPHATE SERPL-MCNC: 10 MG/DL — HIGH (ref 2.4–4.7)
PHOSPHATE SERPL-MCNC: 7.4 MG/DL — HIGH (ref 2.4–4.7)
PHOSPHATE SERPL-MCNC: 8.2 MG/DL — HIGH (ref 2.4–4.7)
PLAT MORPH BLD: NORMAL — SIGNIFICANT CHANGE UP
PLATELET # BLD AUTO: 132 K/UL — LOW (ref 150–400)
PLATELET # BLD AUTO: 181 K/UL — SIGNIFICANT CHANGE UP (ref 150–400)
PLATELET # BLD AUTO: 193 K/UL — SIGNIFICANT CHANGE UP (ref 150–400)
PLATELET COUNT - ESTIMATE: ABNORMAL
PO2 BLDA: 411 MMHG — HIGH (ref 83–108)
PO2 BLDA: 412 MMHG — HIGH (ref 83–108)
PO2 BLDV: 63 MMHG — HIGH (ref 25–45)
POIKILOCYTOSIS BLD QL AUTO: SLIGHT — SIGNIFICANT CHANGE UP
POTASSIUM BLDV-SCNC: 4.3 MMOL/L — SIGNIFICANT CHANGE UP (ref 3.4–4.5)
POTASSIUM SERPL-MCNC: 3.8 MMOL/L — SIGNIFICANT CHANGE UP (ref 3.5–5.3)
POTASSIUM SERPL-MCNC: 4.3 MMOL/L — SIGNIFICANT CHANGE UP (ref 3.5–5.3)
POTASSIUM SERPL-MCNC: 4.5 MMOL/L — SIGNIFICANT CHANGE UP (ref 3.5–5.3)
POTASSIUM SERPL-MCNC: 6.4 MMOL/L — CRITICAL HIGH (ref 3.5–5.3)
POTASSIUM SERPL-SCNC: 3.8 MMOL/L — SIGNIFICANT CHANGE UP (ref 3.5–5.3)
POTASSIUM SERPL-SCNC: 4.3 MMOL/L — SIGNIFICANT CHANGE UP (ref 3.5–5.3)
POTASSIUM SERPL-SCNC: 4.5 MMOL/L — SIGNIFICANT CHANGE UP (ref 3.5–5.3)
POTASSIUM SERPL-SCNC: 6.4 MMOL/L — CRITICAL HIGH (ref 3.5–5.3)
PROCALCITONIN SERPL-MCNC: 1.46 NG/ML — HIGH (ref 0.02–0.1)
PROT SERPL-MCNC: 5.9 G/DL — LOW (ref 6.6–8.7)
PROT SERPL-MCNC: 6.2 G/DL — LOW (ref 6.6–8.7)
PROT SERPL-MCNC: 6.4 G/DL — LOW (ref 6.6–8.7)
PROT SERPL-MCNC: 6.7 G/DL — SIGNIFICANT CHANGE UP (ref 6.6–8.7)
RBC # BLD: 5.14 M/UL — SIGNIFICANT CHANGE UP (ref 4.2–5.8)
RBC # BLD: 5.39 M/UL — SIGNIFICANT CHANGE UP (ref 4.2–5.8)
RBC # BLD: 5.54 M/UL — SIGNIFICANT CHANGE UP (ref 4.2–5.8)
RBC # FLD: 15.1 % — HIGH (ref 10.3–14.5)
RBC # FLD: 15.6 % — HIGH (ref 10.3–14.5)
RBC # FLD: 16.2 % — HIGH (ref 10.3–14.5)
RBC BLD AUTO: NORMAL — SIGNIFICANT CHANGE UP
SAO2 % BLDA: 100 % — HIGH (ref 95–99)
SAO2 % BLDA: 99 % — SIGNIFICANT CHANGE UP (ref 95–99)
SAO2 % BLDV: 81 % — SIGNIFICANT CHANGE UP
SMUDGE CELLS # BLD: PRESENT — SIGNIFICANT CHANGE UP
SODIUM SERPL-SCNC: 127 MMOL/L — LOW (ref 135–145)
SODIUM SERPL-SCNC: 127 MMOL/L — LOW (ref 135–145)
SODIUM SERPL-SCNC: 132 MMOL/L — LOW (ref 135–145)
SODIUM SERPL-SCNC: 134 MMOL/L — LOW (ref 135–145)
TROPONIN T SERPL-MCNC: 0.14 NG/ML — HIGH (ref 0–0.06)
TROPONIN T SERPL-MCNC: 1.37 NG/ML — HIGH (ref 0–0.06)
TROPONIN T SERPL-MCNC: 4.43 NG/ML — HIGH (ref 0–0.06)
WBC # BLD: 17.36 K/UL — HIGH (ref 3.8–10.5)
WBC # BLD: 21.78 K/UL — HIGH (ref 3.8–10.5)
WBC # BLD: 24.86 K/UL — HIGH (ref 3.8–10.5)
WBC # FLD AUTO: 17.36 K/UL — HIGH (ref 3.8–10.5)
WBC # FLD AUTO: 21.78 K/UL — HIGH (ref 3.8–10.5)
WBC # FLD AUTO: 24.86 K/UL — HIGH (ref 3.8–10.5)

## 2020-04-13 PROCEDURE — 31500 INSERT EMERGENCY AIRWAY: CPT

## 2020-04-13 PROCEDURE — 99233 SBSQ HOSP IP/OBS HIGH 50: CPT | Mod: CS

## 2020-04-13 PROCEDURE — 99291 CRITICAL CARE FIRST HOUR: CPT | Mod: CS,25

## 2020-04-13 PROCEDURE — 93970 EXTREMITY STUDY: CPT | Mod: 26

## 2020-04-13 PROCEDURE — 93010 ELECTROCARDIOGRAM REPORT: CPT

## 2020-04-13 PROCEDURE — 93308 TTE F-UP OR LMTD: CPT | Mod: 26

## 2020-04-13 PROCEDURE — 71045 X-RAY EXAM CHEST 1 VIEW: CPT | Mod: 26

## 2020-04-13 RX ORDER — PROPOFOL 10 MG/ML
50 INJECTION, EMULSION INTRAVENOUS ONCE
Refills: 0 | Status: COMPLETED | OUTPATIENT
Start: 2020-04-13 | End: 2020-04-13

## 2020-04-13 RX ORDER — EPINEPHRINE 0.3 MG/.3ML
1 INJECTION INTRAMUSCULAR; SUBCUTANEOUS
Qty: 4 | Refills: 0 | Status: DISCONTINUED | OUTPATIENT
Start: 2020-04-13 | End: 2020-04-14

## 2020-04-13 RX ORDER — DEXTROSE 50 % IN WATER 50 %
12.5 SYRINGE (ML) INTRAVENOUS ONCE
Refills: 0 | Status: DISCONTINUED | OUTPATIENT
Start: 2020-04-13 | End: 2020-04-14

## 2020-04-13 RX ORDER — HYDROXYCHLOROQUINE SULFATE 200 MG
800 TABLET ORAL EVERY 24 HOURS
Refills: 0 | Status: COMPLETED | OUTPATIENT
Start: 2020-04-13 | End: 2020-04-13

## 2020-04-13 RX ORDER — ALTEPLASE 100 MG
100 KIT INTRAVENOUS ONCE
Refills: 0 | Status: COMPLETED | OUTPATIENT
Start: 2020-04-13 | End: 2020-04-13

## 2020-04-13 RX ORDER — CHLORHEXIDINE GLUCONATE 213 G/1000ML
15 SOLUTION TOPICAL EVERY 12 HOURS
Refills: 0 | Status: DISCONTINUED | OUTPATIENT
Start: 2020-04-13 | End: 2020-04-14

## 2020-04-13 RX ORDER — DEXTROSE 50 % IN WATER 50 %
25 SYRINGE (ML) INTRAVENOUS ONCE
Refills: 0 | Status: DISCONTINUED | OUTPATIENT
Start: 2020-04-13 | End: 2020-04-14

## 2020-04-13 RX ORDER — ENOXAPARIN SODIUM 100 MG/ML
90 INJECTION SUBCUTANEOUS DAILY
Refills: 0 | Status: DISCONTINUED | OUTPATIENT
Start: 2020-04-13 | End: 2020-04-13

## 2020-04-13 RX ORDER — DEXTROSE 50 % IN WATER 50 %
15 SYRINGE (ML) INTRAVENOUS ONCE
Refills: 0 | Status: DISCONTINUED | OUTPATIENT
Start: 2020-04-13 | End: 2020-04-14

## 2020-04-13 RX ORDER — PANTOPRAZOLE SODIUM 20 MG/1
40 TABLET, DELAYED RELEASE ORAL DAILY
Refills: 0 | Status: DISCONTINUED | OUTPATIENT
Start: 2020-04-13 | End: 2020-04-14

## 2020-04-13 RX ORDER — HYDROXYCHLOROQUINE SULFATE 200 MG
TABLET ORAL
Refills: 0 | Status: DISCONTINUED | OUTPATIENT
Start: 2020-04-13 | End: 2020-04-14

## 2020-04-13 RX ORDER — PROPOFOL 10 MG/ML
10 INJECTION, EMULSION INTRAVENOUS
Qty: 1000 | Refills: 0 | Status: DISCONTINUED | OUTPATIENT
Start: 2020-04-13 | End: 2020-04-14

## 2020-04-13 RX ORDER — INSULIN LISPRO 100/ML
VIAL (ML) SUBCUTANEOUS EVERY 6 HOURS
Refills: 0 | Status: DISCONTINUED | OUTPATIENT
Start: 2020-04-13 | End: 2020-04-13

## 2020-04-13 RX ORDER — INSULIN HUMAN 100 [IU]/ML
5 INJECTION, SOLUTION SUBCUTANEOUS
Qty: 100 | Refills: 0 | Status: DISCONTINUED | OUTPATIENT
Start: 2020-04-13 | End: 2020-04-14

## 2020-04-13 RX ORDER — SODIUM CHLORIDE 9 MG/ML
1000 INJECTION, SOLUTION INTRAVENOUS
Refills: 0 | Status: DISCONTINUED | OUTPATIENT
Start: 2020-04-13 | End: 2020-04-14

## 2020-04-13 RX ORDER — FENTANYL CITRATE 50 UG/ML
100 INJECTION INTRAVENOUS ONCE
Refills: 0 | Status: DISCONTINUED | OUTPATIENT
Start: 2020-04-13 | End: 2020-04-13

## 2020-04-13 RX ORDER — FENTANYL CITRATE 50 UG/ML
0.5 INJECTION INTRAVENOUS
Qty: 2500 | Refills: 0 | Status: DISCONTINUED | OUTPATIENT
Start: 2020-04-13 | End: 2020-04-14

## 2020-04-13 RX ORDER — GLUCAGON INJECTION, SOLUTION 0.5 MG/.1ML
1 INJECTION, SOLUTION SUBCUTANEOUS ONCE
Refills: 0 | Status: DISCONTINUED | OUTPATIENT
Start: 2020-04-13 | End: 2020-04-14

## 2020-04-13 RX ORDER — ETOMIDATE 2 MG/ML
20 INJECTION INTRAVENOUS ONCE
Refills: 0 | Status: COMPLETED | OUTPATIENT
Start: 2020-04-13 | End: 2020-04-13

## 2020-04-13 RX ORDER — ENOXAPARIN SODIUM 100 MG/ML
90 INJECTION SUBCUTANEOUS DAILY
Refills: 0 | Status: DISCONTINUED | OUTPATIENT
Start: 2020-04-14 | End: 2020-04-14

## 2020-04-13 RX ORDER — ROCURONIUM BROMIDE 10 MG/ML
90 VIAL (ML) INTRAVENOUS ONCE
Refills: 0 | Status: COMPLETED | OUTPATIENT
Start: 2020-04-13 | End: 2020-04-13

## 2020-04-13 RX ORDER — HYDROXYCHLOROQUINE SULFATE 200 MG
400 TABLET ORAL EVERY 24 HOURS
Refills: 0 | Status: DISCONTINUED | OUTPATIENT
Start: 2020-04-14 | End: 2020-04-14

## 2020-04-13 RX ORDER — NOREPINEPHRINE BITARTRATE/D5W 8 MG/250ML
0.05 PLASTIC BAG, INJECTION (ML) INTRAVENOUS
Qty: 8 | Refills: 0 | Status: DISCONTINUED | OUTPATIENT
Start: 2020-04-13 | End: 2020-04-14

## 2020-04-13 RX ORDER — SODIUM CHLORIDE 9 MG/ML
500 INJECTION, SOLUTION INTRAVENOUS ONCE
Refills: 0 | Status: COMPLETED | OUTPATIENT
Start: 2020-04-13 | End: 2020-04-13

## 2020-04-13 RX ORDER — ACETAMINOPHEN 500 MG
650 TABLET ORAL ONCE
Refills: 0 | Status: COMPLETED | OUTPATIENT
Start: 2020-04-13 | End: 2020-04-13

## 2020-04-13 RX ADMIN — INSULIN HUMAN 5 UNIT(S)/HR: 100 INJECTION, SOLUTION SUBCUTANEOUS at 21:49

## 2020-04-13 RX ADMIN — FENTANYL CITRATE 100 MICROGRAM(S): 50 INJECTION INTRAVENOUS at 01:46

## 2020-04-13 RX ADMIN — ENOXAPARIN SODIUM 90 MILLIGRAM(S): 100 INJECTION SUBCUTANEOUS at 12:10

## 2020-04-13 RX ADMIN — Medication 8.44 MICROGRAM(S)/KG/MIN: at 19:29

## 2020-04-13 RX ADMIN — ETOMIDATE 20 MILLIGRAM(S): 2 INJECTION INTRAVENOUS at 00:52

## 2020-04-13 RX ADMIN — PANTOPRAZOLE SODIUM 40 MILLIGRAM(S): 20 TABLET, DELAYED RELEASE ORAL at 12:09

## 2020-04-13 RX ADMIN — FENTANYL CITRATE 4.5 MICROGRAM(S)/KG/HR: 50 INJECTION INTRAVENOUS at 03:12

## 2020-04-13 RX ADMIN — PROPOFOL 50 MILLIGRAM(S): 10 INJECTION, EMULSION INTRAVENOUS at 00:54

## 2020-04-13 RX ADMIN — PROPOFOL 5.4 MICROGRAM(S)/KG/MIN: 10 INJECTION, EMULSION INTRAVENOUS at 23:57

## 2020-04-13 RX ADMIN — Medication 650 MILLIGRAM(S): at 01:46

## 2020-04-13 RX ADMIN — Medication 90 MILLIGRAM(S): at 00:53

## 2020-04-13 RX ADMIN — CHLORHEXIDINE GLUCONATE 15 MILLILITER(S): 213 SOLUTION TOPICAL at 15:51

## 2020-04-13 RX ADMIN — PROPOFOL 5.4 MICROGRAM(S)/KG/MIN: 10 INJECTION, EMULSION INTRAVENOUS at 01:45

## 2020-04-13 RX ADMIN — EPINEPHRINE 338 MICROGRAM(S)/KG/MIN: 0.3 INJECTION INTRAMUSCULAR; SUBCUTANEOUS at 19:29

## 2020-04-13 RX ADMIN — ALTEPLASE 6000 MILLIGRAM(S): KIT at 07:50

## 2020-04-13 RX ADMIN — SODIUM CHLORIDE 1000 MILLILITER(S): 9 INJECTION, SOLUTION INTRAVENOUS at 13:05

## 2020-04-13 RX ADMIN — PROPOFOL 5.4 MICROGRAM(S)/KG/MIN: 10 INJECTION, EMULSION INTRAVENOUS at 19:28

## 2020-04-13 RX ADMIN — EPINEPHRINE 338 MICROGRAM(S)/KG/MIN: 0.3 INJECTION INTRAMUSCULAR; SUBCUTANEOUS at 23:57

## 2020-04-13 RX ADMIN — CHLORHEXIDINE GLUCONATE 15 MILLILITER(S): 213 SOLUTION TOPICAL at 09:41

## 2020-04-13 RX ADMIN — Medication 800 MILLIGRAM(S): at 06:31

## 2020-04-13 RX ADMIN — PROPOFOL 5.4 MICROGRAM(S)/KG/MIN: 10 INJECTION, EMULSION INTRAVENOUS at 12:35

## 2020-04-13 NOTE — ED PROVIDER NOTE - CRITICAL CARE PROVIDED
direct patient care (not related to procedure)/interpretation of diagnostic studies/consultation with other physicians/additional history taking/documentation/conducted a detailed discussion of DNR status
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
## 2020-04-13 NOTE — PROCEDURE NOTE - NSCATHTYPE_GEN_A_CORE
23yF pmhx asthma, PCOS, food allergies c/o 20min of perioral tingling, RT upper lip swelling, b/l finger tingling, and feeling like she has a lump in her throat; pt is a phlebotomist at [employer], states she was performing her usual job duties when sx started; last ate approx 5hrs ago, no new foods or new meds, no environmental exposure; states she had URI sx a couple days ago that have resolved. States that sx don't feel like asthma attacks. Denies fever/chills, SOB, chest pain, n/v/d, abd pain.
CVC

## 2020-04-13 NOTE — PROCEDURE NOTE - NSPROCDETAILS_GEN_ALL_CORE
sutured in place/connected to a pressurized flush line/hemostasis with direct pressure, dressing applied/Seldinger technique/positive blood return obtained via catheter/all materials/supplies accounted for at end of procedure

## 2020-04-13 NOTE — PROCEDURE NOTE - ADDITIONAL PROCEDURE DETAILS
PAtient with cardiac arrest, acute hypoxemic respiratory failure, COVID-19, shock, suspected PE. Procedure performed independently of critical care time.
PAtient with cardiac arrest, acute hypoxemic respiratory failure, COVID-19, shock, suspected PE. Procedure performed independently of critical care time.

## 2020-04-13 NOTE — ED ADULT NURSE NOTE - INTERVENTIONS DEFINITIONS
Monitor for mental status changes and reorient to person, place, and time/Non-slip footwear when patient is off stretcher/Stretcher in lowest position, wheels locked, appropriate side rails in place/Physically safe environment: no spills, clutter or unnecessary equipment

## 2020-04-13 NOTE — ED PROVIDER NOTE - PHYSICAL EXAMINATION
Cons: (+) AMS  Head: atraumatic, normocephalic  Face: atraumatic, no crepitus no orbiral/maxillary/mandibular ttp  throat: uvula midline no exudates  eyes: perrla eomi  heart: rrr s1s2  lungs:(+) 22% SPO2 on RA   abd: soft, nt nd +bs no rebound/guarding no cva ttp  skin: warm  LE: no swelling, no calf ttp  back: no midline cervical/thoracic/lumbar TTP Cons: (+) AMS  Head: atraumatic, normocephalic  Face: atraumatic, no crepitus no orbiral/maxillary/mandibular ttp  eyes: perrla   heart: rrr s1s2  lungs:(+) 22% SPO2 on RA   abd: soft, nt nd +bs no rebound/guarding no cva ttp  skin: warm  LE: no swelling, no calf ttp  back: no midline cervical/thoracic/lumbar TTP

## 2020-04-13 NOTE — ED PROVIDER NOTE - CLINICAL SUMMARY MEDICAL DECISION MAKING FREE TEXT BOX
COVID +, hypoxic to 22% on RA, and AMS upon arrival to ED, required intubation on arrival. Will start on propofol and levo peripherally. ICU  consulted, will admit COVID + hypoxic to 22% on RA, and AMS upon arrival to ED, required intubation on arrival. Will start on propofol and levo peripherally. ICU  consulted, will admit
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
## 2020-04-13 NOTE — H&P ADULT - NSHPPHYSICALEXAM_GEN_ALL_CORE
Mode: AC/ CMV (Assist Control/ Continuous Mandatory Ventilation)  RR (machine): 20  TV (machine): 400  FiO2: 100  PEEP: 20  MAP: 24  PIP: 30        Vital signs / Reviewed and Physical Exam Performed where pertinent and urgently required    Lab / Radiology  studies / ABG / Meds -  reviewed and interpreted into the assessment and treatment plan.
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
## 2020-04-13 NOTE — ED ADULT NURSE NOTE - OBJECTIVE STATEMENT
Pt brought to critical care from triage with SpO2 in low 20's. per family patient was c/o sob prior to arrival. brought in by family. +covid per family.  MD Shepard and Respiratory called to bedside. Patient placed on cardiac monitor, pulse ox, and defib pads. Pt placed on bag/valve mask with increasing SpO2. 20g placed in lft hand, 18 secured in rt ac. Etomidate admin by MD Hanks @ 00:52, Miguel admin by MD Hanks @00:53. 7.5 ETT secured 24" at the lip. 18fr OG tube placed @00:59. lung sounds diminished bilaterally with crackles at bases. no upper or lower extremity swelling noted. color pale. will continue to observe. Pt brought to critical care from triage, patient was pulled out of car with SpO2 in low 20's. per family patient was c/o sob prior to arrival. brought in by family. +covid per family.  MD Shepard and Respiratory called to bedside. Patient placed on cardiac monitor, pulse ox, and defib pads. Pt placed on bag/valve mask with increasing SpO2. 20g placed in left hand, 18 secured in rt ac. Etomidate admin by MD Hanks @ 00:52, Miguel admin by MD Hanks @00:53. 7.5 ETT secured 24" at the lip. 18fr OG tube placed @00:59. lung sounds diminished bilaterally with crackles at bases. no upper or lower extremity swelling noted. color pale. will continue to observe.

## 2020-04-13 NOTE — PROGRESS NOTE ADULT - SUBJECTIVE AND OBJECTIVE BOX
Patient is a 85y old  Male who presents with a chief complaint of     BRIEF HOSPITAL COURSE: 85y Male  DM, PAD, HTN, elderly    Events last 24 hours: TPA for D-dimer = 50,000: CP arrest - resusitated on pressors and AC    PAST MEDICAL & SURGICAL HISTORY:  PAD (peripheral artery disease): s/p stent  High cholesterol  Diabetes  HTN (hypertension)  H/O craniotomy: subdural hematoma        Hosp day #1    Vent day #1  Mode: AC/ CMV (Assist Control/ Continuous Mandatory Ventilation)  RR (machine): 20  TV (machine): 400  FiO2: 100  PEEP: 20  MAP: 24  PIP: 30        Vital signs / Reviewed and Physical Exam Performed where pertinent and urgently required  T(C): 37.4 (04-13-20 @ 01:08), Max: 37.4 (04-13-20 @ 01:08)  HR: 115 (04-13-20 @ 07:10) (106 - 138)  BP: 111/54 (04-13-20 @ 07:10) (101/58 - 180/82)  RR: 24 (04-13-20 @ 06:26) (20 - 26)  SpO2: 93% (04-13-20 @ 07:10) (93% - 100%)  04-12-20 @ 07:01  -  04-13-20 @ 07:00  --------------------------------------------------------  IN: 31.5 mL / OUT: 0 mL / NET: 31.5 mL      Lab / Radiology  studies / ABG / Meds -  reviewed and interpreted into the assessment and treatment plan.  alteplase    Bolus 100 milliGRAM(s) IV Bolus once  chlorhexidine 0.12% Liquid 15 milliLiter(s) Oral Mucosa every 12 hours  dextrose 40% Gel 15 Gram(s) Oral once PRN  dextrose 5%. 1000 milliLiter(s) IV Continuous <Continuous>  dextrose 50% Injectable 12.5 Gram(s) IV Push once  dextrose 50% Injectable 25 Gram(s) IV Push once  dextrose 50% Injectable 25 Gram(s) IV Push once  enoxaparin Injectable 90 milliGRAM(s) SubCutaneous daily  fentaNYL   Infusion. 0.5 MICROgram(s)/kG/Hr IV Continuous <Continuous>  glucagon  Injectable 1 milliGRAM(s) IntraMuscular once PRN  hydroxychloroquine   Oral   insulin lispro (HumaLOG) corrective regimen sliding scale   SubCutaneous every 6 hours  norepinephrine Infusion 0.05 MICROgram(s)/kG/Min IV Continuous <Continuous>  pantoprazole  Injectable 40 milliGRAM(s) IV Push daily  propofol Infusion 10 MICROgram(s)/kG/Min IV Continuous <Continuous>      Assessment/Plan/Therapeutic interventions  Problems:  Acute Hypoxic respiratory failure related to COVID-19 infection, Viral PNA with asscoitated ARDS  Acute renal failure HCO3, Arterial: 25 mmoL/L (04.13.20 @ 08:19)      ICU Delirium     Neuro - Sedation neuromuscular blockade to facilitate safe ventilation/ Current Sedaton strategy includes; propofol    CV -  Pressor support as needed to maintain MAP 65           Avoiding fluid challenges          QTC monitoring while on Azithromycin and Hydroxychloroquine.    Pulm -  ARDS-NET 4-6cc/kg IBW TV as able to maintain plateau pressures <30               Prone ventilation consideration as feasible  Pa02/Fi02 < 150 on Fi02 >60% and PEEP at least 5                Current FIO2/PEEP requirement is 70/10               Decadron added due to elevated Peak/Plataeu pressures planned                Vent bundle Reviewed     GI -  PPI  Enteric feeds as tolerated in tandem with NMB and prone ventilation    Renal - Even to negative fluid balance as tolerated by hemodynamics and renal fx.  Feeds to be provided in lieu of IVF.                Currently Off Diuretics    Heme -  Pharmacologic DVT PPx  in addition to SCD's, HIgher dose of Lovenox being administered given prothrombtic nature of COVID infection - TPA given    ID - ABX discontinuation based on discussion with ID in conjunction with clinical features, culture data, and judicious procalcitonin monitoring.    Plaquenil/Azithromyzin planned    Endo -  Aggressive glycemic control to limit FS glucose to < 180mg/dl.         COVID 19 specific considerations and therapeutic  options based on the available and rapidly changing literature    Goals of care considerations:  Ongoing assessment for patient specific treatment options based on progression or decline.  I have involved the family with updates and requests in guidance for medical decision making.          42  Minutes of critical care time spent in the management of this critically ill COVID-19 patient/PUI patient with continuous assessments and interventions based on the interpretation of multiple databases.

## 2020-04-14 VITALS — TEMPERATURE: 101 F

## 2020-04-14 LAB
ALBUMIN SERPL ELPH-MCNC: 2.8 G/DL — LOW (ref 3.3–5.2)
ALP SERPL-CCNC: 109 U/L — SIGNIFICANT CHANGE UP (ref 40–120)
ALT FLD-CCNC: 49 U/L — HIGH
ANION GAP SERPL CALC-SCNC: 21 MMOL/L — HIGH (ref 5–17)
AST SERPL-CCNC: 109 U/L — HIGH
BASOPHILS # BLD AUTO: 0.07 K/UL — SIGNIFICANT CHANGE UP (ref 0–0.2)
BASOPHILS NFR BLD AUTO: 0.3 % — SIGNIFICANT CHANGE UP (ref 0–2)
BILIRUB SERPL-MCNC: 0.4 MG/DL — SIGNIFICANT CHANGE UP (ref 0.4–2)
BUN SERPL-MCNC: 92 MG/DL — HIGH (ref 8–20)
CALCIUM SERPL-MCNC: 7.8 MG/DL — LOW (ref 8.6–10.2)
CHLORIDE SERPL-SCNC: 97 MMOL/L — LOW (ref 98–107)
CO2 SERPL-SCNC: 23 MMOL/L — SIGNIFICANT CHANGE UP (ref 22–29)
CREAT SERPL-MCNC: 5.76 MG/DL — HIGH (ref 0.5–1.3)
EOSINOPHIL # BLD AUTO: 0.06 K/UL — SIGNIFICANT CHANGE UP (ref 0–0.5)
EOSINOPHIL NFR BLD AUTO: 0.3 % — SIGNIFICANT CHANGE UP (ref 0–6)
ESTIMATED AVERAGE GLUCOSE: 197 MG/DL — HIGH (ref 68–114)
GAS PNL BLDA: SIGNIFICANT CHANGE UP
GLUCOSE BLDC GLUCOMTR-MCNC: 107 MG/DL — HIGH (ref 70–99)
GLUCOSE BLDC GLUCOMTR-MCNC: 107 MG/DL — HIGH (ref 70–99)
GLUCOSE BLDC GLUCOMTR-MCNC: 112 MG/DL — HIGH (ref 70–99)
GLUCOSE BLDC GLUCOMTR-MCNC: 117 MG/DL — HIGH (ref 70–99)
GLUCOSE BLDC GLUCOMTR-MCNC: 125 MG/DL — HIGH (ref 70–99)
GLUCOSE BLDC GLUCOMTR-MCNC: 129 MG/DL — HIGH (ref 70–99)
GLUCOSE BLDC GLUCOMTR-MCNC: 133 MG/DL — HIGH (ref 70–99)
GLUCOSE BLDC GLUCOMTR-MCNC: 134 MG/DL — HIGH (ref 70–99)
GLUCOSE BLDC GLUCOMTR-MCNC: 138 MG/DL — HIGH (ref 70–99)
GLUCOSE BLDC GLUCOMTR-MCNC: 140 MG/DL — HIGH (ref 70–99)
GLUCOSE BLDC GLUCOMTR-MCNC: 148 MG/DL — HIGH (ref 70–99)
GLUCOSE BLDC GLUCOMTR-MCNC: 155 MG/DL — HIGH (ref 70–99)
GLUCOSE BLDC GLUCOMTR-MCNC: 184 MG/DL — HIGH (ref 70–99)
GLUCOSE BLDC GLUCOMTR-MCNC: 198 MG/DL — HIGH (ref 70–99)
GLUCOSE BLDC GLUCOMTR-MCNC: 222 MG/DL — HIGH (ref 70–99)
GLUCOSE BLDC GLUCOMTR-MCNC: 236 MG/DL — HIGH (ref 70–99)
GLUCOSE BLDC GLUCOMTR-MCNC: 241 MG/DL — HIGH (ref 70–99)
GLUCOSE BLDC GLUCOMTR-MCNC: 329 MG/DL — HIGH (ref 70–99)
GLUCOSE BLDC GLUCOMTR-MCNC: 425 MG/DL — HIGH (ref 70–99)
GLUCOSE SERPL-MCNC: 126 MG/DL — HIGH (ref 70–99)
HBA1C BLD-MCNC: 8.5 % — HIGH (ref 4–5.6)
HCT VFR BLD CALC: 39.6 % — SIGNIFICANT CHANGE UP (ref 39–50)
HGB BLD-MCNC: 13.2 G/DL — SIGNIFICANT CHANGE UP (ref 13–17)
IMM GRANULOCYTES NFR BLD AUTO: 4.4 % — HIGH (ref 0–1.5)
LACTATE SERPL-SCNC: 2.2 MMOL/L — HIGH (ref 0.5–2)
LYMPHOCYTES # BLD AUTO: 2.12 K/UL — SIGNIFICANT CHANGE UP (ref 1–3.3)
LYMPHOCYTES # BLD AUTO: 9.4 % — LOW (ref 13–44)
MAGNESIUM SERPL-MCNC: 2.9 MG/DL — HIGH (ref 1.6–2.6)
MCHC RBC-ENTMCNC: 26.6 PG — LOW (ref 27–34)
MCHC RBC-ENTMCNC: 33.3 GM/DL — SIGNIFICANT CHANGE UP (ref 32–36)
MCV RBC AUTO: 79.7 FL — LOW (ref 80–100)
MONOCYTES # BLD AUTO: 1.81 K/UL — HIGH (ref 0–0.9)
MONOCYTES NFR BLD AUTO: 8 % — SIGNIFICANT CHANGE UP (ref 2–14)
NEUTROPHILS # BLD AUTO: 17.62 K/UL — HIGH (ref 1.8–7.4)
NEUTROPHILS NFR BLD AUTO: 77.6 % — HIGH (ref 43–77)
NT-PROBNP SERPL-SCNC: HIGH PG/ML (ref 0–300)
PLATELET # BLD AUTO: 266 K/UL — SIGNIFICANT CHANGE UP (ref 150–400)
POTASSIUM SERPL-MCNC: 4.1 MMOL/L — SIGNIFICANT CHANGE UP (ref 3.5–5.3)
POTASSIUM SERPL-SCNC: 4.1 MMOL/L — SIGNIFICANT CHANGE UP (ref 3.5–5.3)
PROT SERPL-MCNC: 5.8 G/DL — LOW (ref 6.6–8.7)
RBC # BLD: 4.97 M/UL — SIGNIFICANT CHANGE UP (ref 4.2–5.8)
RBC # FLD: 16 % — HIGH (ref 10.3–14.5)
SODIUM SERPL-SCNC: 140 MMOL/L — SIGNIFICANT CHANGE UP (ref 135–145)
TROPONIN T SERPL-MCNC: 6.48 NG/ML — HIGH (ref 0–0.06)
WBC # BLD: 22.67 K/UL — HIGH (ref 3.8–10.5)
WBC # FLD AUTO: 22.67 K/UL — HIGH (ref 3.8–10.5)

## 2020-04-14 PROCEDURE — 99223 1ST HOSP IP/OBS HIGH 75: CPT | Mod: CS

## 2020-04-14 PROCEDURE — 99291 CRITICAL CARE FIRST HOUR: CPT | Mod: CS

## 2020-04-14 PROCEDURE — 99497 ADVNCD CARE PLAN 30 MIN: CPT | Mod: CS

## 2020-04-14 RX ORDER — SODIUM CHLORIDE 9 MG/ML
1000 INJECTION INTRAMUSCULAR; INTRAVENOUS; SUBCUTANEOUS
Refills: 0 | Status: DISCONTINUED | OUTPATIENT
Start: 2020-04-14 | End: 2020-04-14

## 2020-04-14 RX ORDER — MIDAZOLAM HYDROCHLORIDE 1 MG/ML
4 INJECTION, SOLUTION INTRAMUSCULAR; INTRAVENOUS
Refills: 0 | Status: DISCONTINUED | OUTPATIENT
Start: 2020-04-14 | End: 2020-04-15

## 2020-04-14 RX ORDER — POTASSIUM CHLORIDE 20 MEQ
10 PACKET (EA) ORAL
Refills: 0 | Status: COMPLETED | OUTPATIENT
Start: 2020-04-14 | End: 2020-04-14

## 2020-04-14 RX ORDER — HYDROMORPHONE HYDROCHLORIDE 2 MG/ML
6 INJECTION INTRAMUSCULAR; INTRAVENOUS; SUBCUTANEOUS
Qty: 100 | Refills: 0 | Status: DISCONTINUED | OUTPATIENT
Start: 2020-04-14 | End: 2020-04-15

## 2020-04-14 RX ORDER — HYDROMORPHONE HYDROCHLORIDE 2 MG/ML
4 INJECTION INTRAMUSCULAR; INTRAVENOUS; SUBCUTANEOUS ONCE
Refills: 0 | Status: DISCONTINUED | OUTPATIENT
Start: 2020-04-14 | End: 2020-04-15

## 2020-04-14 RX ORDER — SODIUM CHLORIDE 9 MG/ML
1000 INJECTION, SOLUTION INTRAVENOUS
Refills: 0 | Status: DISCONTINUED | OUTPATIENT
Start: 2020-04-14 | End: 2020-04-14

## 2020-04-14 RX ORDER — VASOPRESSIN 20 [USP'U]/ML
0.04 INJECTION INTRAVENOUS
Qty: 50 | Refills: 0 | Status: DISCONTINUED | OUTPATIENT
Start: 2020-04-14 | End: 2020-04-14

## 2020-04-14 RX ORDER — SODIUM CHLORIDE 9 MG/ML
1000 INJECTION INTRAMUSCULAR; INTRAVENOUS; SUBCUTANEOUS ONCE
Refills: 0 | Status: DISCONTINUED | OUTPATIENT
Start: 2020-04-14 | End: 2020-04-14

## 2020-04-14 RX ADMIN — EPINEPHRINE 338 MICROGRAM(S)/KG/MIN: 0.3 INJECTION INTRAMUSCULAR; SUBCUTANEOUS at 03:21

## 2020-04-14 RX ADMIN — ENOXAPARIN SODIUM 90 MILLIGRAM(S): 100 INJECTION SUBCUTANEOUS at 10:45

## 2020-04-14 RX ADMIN — CHLORHEXIDINE GLUCONATE 15 MILLILITER(S): 213 SOLUTION TOPICAL at 05:30

## 2020-04-14 RX ADMIN — VASOPRESSIN 2.4 UNIT(S)/MIN: 20 INJECTION INTRAVENOUS at 09:25

## 2020-04-14 RX ADMIN — PROPOFOL 5.4 MICROGRAM(S)/KG/MIN: 10 INJECTION, EMULSION INTRAVENOUS at 06:18

## 2020-04-14 RX ADMIN — PANTOPRAZOLE SODIUM 40 MILLIGRAM(S): 20 TABLET, DELAYED RELEASE ORAL at 10:44

## 2020-04-14 RX ADMIN — INSULIN HUMAN 5 UNIT(S)/HR: 100 INJECTION, SOLUTION SUBCUTANEOUS at 01:24

## 2020-04-14 RX ADMIN — FENTANYL CITRATE 4.5 MICROGRAM(S)/KG/HR: 50 INJECTION INTRAVENOUS at 06:43

## 2020-04-14 RX ADMIN — Medication 8.44 MICROGRAM(S)/KG/MIN: at 01:59

## 2020-04-14 RX ADMIN — CHLORHEXIDINE GLUCONATE 15 MILLILITER(S): 213 SOLUTION TOPICAL at 18:05

## 2020-04-14 RX ADMIN — Medication 100 MILLIEQUIVALENT(S): at 01:59

## 2020-04-14 RX ADMIN — Medication 100 MILLIEQUIVALENT(S): at 01:08

## 2020-04-14 RX ADMIN — MIDAZOLAM HYDROCHLORIDE 4 MILLIGRAM(S): 1 INJECTION, SOLUTION INTRAMUSCULAR; INTRAVENOUS at 20:29

## 2020-04-14 RX ADMIN — Medication 100 MILLIEQUIVALENT(S): at 03:20

## 2020-04-14 RX ADMIN — HYDROMORPHONE HYDROCHLORIDE 6 MG/HR: 2 INJECTION INTRAMUSCULAR; INTRAVENOUS; SUBCUTANEOUS at 20:04

## 2020-04-14 NOTE — PROGRESS NOTE ADULT - SUBJECTIVE AND OBJECTIVE BOX
Patient is a 85y old  Male who presents with a chief complaint of     BRIEF HOSPITAL COURSE: 84 y/o M with DM, HTN, HLD, and PAD presents to ED in respiratory distress, pt has had 1 week of respiratory symptoms last week  tested for COVID, PCR COVID (+). It was reported that pt was found in his car by his family with difficultly breathing.  Daughter in law brought him to ED after patient's wife called her stating patient was not breathing properly .In triage, SPO2 was 22% on RA, patient brought to critical care, and was subsequently intubated, also placed on levophed transiently post intubation. Pt admitted to the ICU for acute hypoxic respiratory failure, BENITEZ ATN, distributive vs. septic shock, r/o PE (CTA not able to be performed 2/2 BENITEZ Cr 2.4).  Pt treated with empiric TPA    Events last 24 hours: sedated on epi and Levo. No distress. Pt DNR    PAST MEDICAL & SURGICAL HISTORY:  PAD (peripheral artery disease): s/p stent  High cholesterol  Diabetes  HTN (hypertension)  H/O craniotomy: subdural hematoma        Medications:  hydroxychloroquine   Oral   hydroxychloroquine 400 milliGRAM(s) Oral every 24 hours    EPINEPHrine    Infusion 1 MICROgram(s)/kG/Min IV Continuous <Continuous>  norepinephrine Infusion 0.05 MICROgram(s)/kG/Min IV Continuous <Continuous>      fentaNYL   Infusion. 0.5 MICROgram(s)/kG/Hr IV Continuous <Continuous>  propofol Infusion 10 MICROgram(s)/kG/Min IV Continuous <Continuous>      enoxaparin Injectable 90 milliGRAM(s) SubCutaneous daily    pantoprazole  Injectable 40 milliGRAM(s) IV Push daily      dextrose 40% Gel 15 Gram(s) Oral once PRN  dextrose 50% Injectable 12.5 Gram(s) IV Push once  dextrose 50% Injectable 25 Gram(s) IV Push once  dextrose 50% Injectable 25 Gram(s) IV Push once  glucagon  Injectable 1 milliGRAM(s) IntraMuscular once PRN  insulin regular Infusion 5 Unit(s)/Hr IV Continuous <Continuous>  vasopressin Infusion 0.04 Unit(s)/Min IV Continuous <Continuous>    dextrose 5%. 1000 milliLiter(s) IV Continuous <Continuous>      chlorhexidine 0.12% Liquid 15 milliLiter(s) Oral Mucosa every 12 hours        Mode: AC/ CMV (Assist Control/ Continuous Mandatory Ventilation)  RR (machine): 30  TV (machine): 400  FiO2: 40  PEEP: 5  ITime: 0.8  MAP: 14  PIP: 27      ICU Vital Signs Last 24 Hrs  T(C): 37.3 (14 Apr 2020 07:42), Max: 37.4 (14 Apr 2020 04:00)  T(F): 99.2 (14 Apr 2020 07:42), Max: 99.4 (14 Apr 2020 04:00)  HR: 101 (14 Apr 2020 09:15) (100 - 107)  BP: --  BP(mean): --  ABP: 133/61 (14 Apr 2020 08:17) (106/51 - 133/61)  ABP(mean): 82 (14 Apr 2020 08:17) (70 - 91)  RR: 28 (14 Apr 2020 08:17) (18 - 33)  SpO2: 98% (14 Apr 2020 09:15) (97% - 100%)      ABG - ( 14 Apr 2020 05:59 )  pH, Arterial: 7.33  pH, Blood: x     /  pCO2: 49    /  pO2: 110   / HCO3: 24    / Base Excess: -0.3  /  SaO2: 98                  I&O's Detail    13 Apr 2020 07:01  -  14 Apr 2020 07:00  --------------------------------------------------------  IN:    EPINEPHrine Infusion: 1595 mL    fentaNYL Infusion.: 173.5 mL    insulin regular Infusion: 234 mL    IV PiggyBack: 300 mL    Lactated Ringers IV Bolus: 500 mL    norepinephrine Infusion: 929 mL    propofol Infusion: 240.4 mL  Total IN: 3971.9 mL    OUT:    Indwelling Catheter - Urethral: 51 mL  Total OUT: 51 mL    Total NET: 3920.9 mL      14 Apr 2020 07:01  -  14 Apr 2020 12:08  --------------------------------------------------------  IN:    EPINEPHrine Infusion: 120 mL    fentaNYL Infusion.: 50 mL    insulin regular Infusion: 7 mL    norepinephrine Infusion: 187.5 mL    propofol Infusion: 54 mL    vasopressin Infusion: 9.6 mL  Total IN: 428.1 mL    OUT:    Indwelling Catheter - Urethral: 60 mL  Total OUT: 60 mL    Total NET: 368.1 mL            LABS:                        13.2   22.67 )-----------( 266      ( 14 Apr 2020 05:34 )             39.6     04-14    140  |  97<L>  |  92.0<H>  ----------------------------<  126<H>  4.1   |  23.0  |  5.76<H>    Ca    7.8<L>      14 Apr 2020 05:34  Phos  7.4     04-13  Mg     2.9     04-14    TPro  5.8<L>  /  Alb  2.8<L>  /  TBili  0.4  /  DBili  x   /  AST  109<H>  /  ALT  49<H>  /  AlkPhos  109  04-14      CARDIAC MARKERS ( 14 Apr 2020 05:34 )  x     / 6.48 ng/mL / x     / x     / x      CARDIAC MARKERS ( 13 Apr 2020 10:26 )  x     / 4.43 ng/mL / x     / x     / x      CARDIAC MARKERS ( 13 Apr 2020 06:50 )  x     / 1.37 ng/mL / x     / x     / x      CARDIAC MARKERS ( 13 Apr 2020 01:10 )  x     / 0.14 ng/mL / x     / x     / x          CAPILLARY BLOOD GLUCOSE      POCT Blood Glucose.: 140 mg/dL (14 Apr 2020 11:11)        CULTURES:      Physical Examination: see below    General: sedated intubated     HEENT: Pupils equal, reactive to light.  Symmetric.    PULM: Clear to auscultation bilaterally, no significant sputum production    NECK: Supple, no lymphadenopathy, trachea midline    CVS: Regular rate and rhythm, no murmurs, rubs, or gallops    ABD: Soft, nondistended, nontender, normoactive bowel sounds, no masses    EXT: No edema, nontender    SKIN: Warm and well perfused, no rashes noted.        DEVICES:     RADIOLOGY: All films reviewed on PACS    CRITICAL CARE TIME SPENT: ***

## 2020-04-14 NOTE — GOALS OF CARE CONVERSATION - ADVANCED CARE PLANNING - CONVERSATION DETAILS
considering multi-organ failure and not able to offer CVVHD with > 99% mortality with cardiovascular, respiratory, kidney failure and cardiac arrest *1-> GOC discussed with Family (wife and Son at bedside and changed to comfort measures only

## 2020-04-14 NOTE — CONSULT NOTE ADULT - ASSESSMENT
1) BENITEZ on CKD stage 3; oliguric  2)ARDS      Baseline CKD stage 3 as per chart review  Will not offer HD in view of advanced age, multiple comorbidities ; also considering prioritization of acute care in setting of scarce resources with ongoing pandemic.  GOC discussions with family.

## 2020-04-14 NOTE — CONSULT NOTE ADULT - SUBJECTIVE AND OBJECTIVE BOX
[facility] DIVISION OF KIDNEY DISEASES AND HYPERTENSION -- INITIAL CONSULT NOTE  --------------------------------------------------------------------------------  HPI:     "86 y/o M with DM, HTN, HLD, and PAD presents to ED in respiratory distress, pt has had 1 week of respiratory symptoms last week  tested for COVID, PCR COVID (+). It was reported that pt was found in his car by his family with difficultly breathing.  Daughter in law brought him to ED after patient's wife called her stating patient was not breathing properly .In triage, SPO2 was 22% on RA, patient brought to critical care, and was subsequently intubated, also placed on levophed transiently post intubation. Pt admitted to the ICU for acute hypoxic respiratory failure, BENITEZ ATN, distributive vs. septic shock, r/o PE (CTA not able to be performed 2/2 BENITEZ Cr 2.4).  Pt treated with empiric TPA"    4/13: Patient then quickly deteriorated, hypoxia, hypotension and then witnessed cardiac arrest, ACLS performed, high suspicion for massive PE and prompting tPA 100mg IVP, ROSC achieved, epinephrine added and vent requirements quickly escalated.     Nephrology consulted for worsening BENITEZ.      PAST HISTORY  --------------------------------------------------------------------------------  PAST MEDICAL & SURGICAL HISTORY:  PAD (peripheral artery disease): s/p stent  High cholesterol  Diabetes  HTN (hypertension)  H/O craniotomy: subdural hematoma    FAMILY HISTORY: non contributory    PAST SOCIAL HISTORY;     ALLERGIES & MEDICATIONS  --------------------------------------------------------------------------------  Allergies    No Known Allergies    Intolerances      Standing Inpatient Medications  chlorhexidine 0.12% Liquid 15 milliLiter(s) Oral Mucosa every 12 hours  dextrose 50% Injectable 12.5 Gram(s) IV Push once  dextrose 50% Injectable 25 Gram(s) IV Push once  dextrose 50% Injectable 25 Gram(s) IV Push once  enoxaparin Injectable 90 milliGRAM(s) SubCutaneous daily  EPINEPHrine    Infusion 1 MICROgram(s)/kG/Min IV Continuous <Continuous>  fentaNYL   Infusion. 0.5 MICROgram(s)/kG/Hr IV Continuous <Continuous>  hydroxychloroquine   Oral   hydroxychloroquine 400 milliGRAM(s) Oral every 24 hours  insulin regular Infusion 5 Unit(s)/Hr IV Continuous <Continuous>  norepinephrine Infusion 0.05 MICROgram(s)/kG/Min IV Continuous <Continuous>  pantoprazole  Injectable 40 milliGRAM(s) IV Push daily  propofol Infusion 10 MICROgram(s)/kG/Min IV Continuous <Continuous>  sodium chloride 0.9% Bolus 1000 milliLiter(s) IV Bolus once  sodium chloride 0.9%. 1000 milliLiter(s) IV Continuous <Continuous>  vasopressin Infusion 0.04 Unit(s)/Min IV Continuous <Continuous>    PRN Inpatient Medications  dextrose 40% Gel 15 Gram(s) Oral once PRN  glucagon  Injectable 1 milliGRAM(s) IntraMuscular once PRN      REVIEW OF SYSTEMS  --------------------------------------------------------------------------------  unable to obtain    VITALS/PHYSICAL EXAM  --------------------------------------------------------------------------------  T(C): 37.3 (04-14-20 @ 07:42), Max: 37.4 (04-14-20 @ 04:00)  HR: 101 (04-14-20 @ 09:15) (100 - 107)  BP: --  RR: 28 (04-14-20 @ 08:17) (18 - 33)  SpO2: 98% (04-14-20 @ 09:15) (97% - 100%)  Wt(kg): --  Height (cm): 177.8 (04-13-20 @ 00:43)  Weight (kg): 90 (04-13-20 @ 00:53)  BMI (kg/m2): 28.5 (04-13-20 @ 00:53)  BSA (m2): 2.08 (04-13-20 @ 00:53)      04-13-20 @ 07:01  -  04-14-20 @ 07:00  --------------------------------------------------------  IN: 3971.9 mL / OUT: 51 mL / NET: 3920.9 mL    04-14-20 @ 07:01  -  04-14-20 @ 12:20  --------------------------------------------------------  IN: 428.1 mL / OUT: 60 mL / NET: 368.1 mL      Physical Exam:  	Due to the nature of the pt's isolation status, no bedside physical exam done to limit spread of infection. Objective data was reviewed in detail.    LABS/STUDIES  --------------------------------------------------------------------------------              13.2   22.67 >-----------<  266      [04-14-20 @ 05:34]              39.6     140  |  97  |  92.0  ----------------------------<  126      [04-14-20 @ 05:34]  4.1   |  23.0  |  5.76        Ca     7.8     [04-14-20 @ 05:34]      Mg     2.9     [04-14-20 @ 05:34]      Phos  7.4     [04-13-20 @ 20:50]    TPro  5.8  /  Alb  2.8  /  TBili  0.4  /  DBili  x   /  AST  109  /  ALT  49  /  AlkPhos  109  [04-14-20 @ 05:34]        Troponin 6.48      [04-14-20 @ 05:34]        [04-13-20 @ 01:10]    Creatinine Trend:  SCr 5.76 [04-14 @ 05:34]  SCr 5.05 [04-13 @ 20:50]  SCr 3.49 [04-13 @ 10:26]  SCr 3.13 [04-13 @ 06:50]  SCr 2.44 [04-13 @ 01:10]        Ferritin 964      [04-13-20 @ 01:10]  HbA1c 8.5      [04-14-20 @ 05:34]

## 2020-04-14 NOTE — PROGRESS NOTE ADULT - SUBJECTIVE AND OBJECTIVE BOX
Patient is a 85y old  Male who presents with a chief complaint of     BRIEF HOSPITAL COURSE:   84 yo m DM, HTN, HLD, PAD presented 4/13 sob, found to be hypoxic to 22% on pulse ox and was subsequently intubated and vasopressor therapy admitted with COVID 19 respiratory failure.    4/13: Patient then quickly deteriorated, hypoxia, hypotension and then witnessed cardiac arrest, ACLS performed, high suspicion for massive PE and prompting tPA 100mg IVP, ROSC achieved, epinephrine added and vent requirements quickly escalated.     Events last 24 hours:   Overnight, vent requirements improving,     PAST MEDICAL & SURGICAL HISTORY:  PAD (peripheral artery disease): s/p stent  High cholesterol  Diabetes  HTN (hypertension)  H/O craniotomy: subdural hematoma    Allergies  No Known Allergies      Review of Systems:  Unable to obtain 2/2 intubated/sedated      Medications:  hydroxychloroquine   Oral   hydroxychloroquine 400 milliGRAM(s) Oral every 24 hours  EPINEPHrine    Infusion 1 MICROgram(s)/kG/Min IV Continuous <Continuous>  norepinephrine Infusion 0.05 MICROgram(s)/kG/Min IV Continuous <Continuous>  fentaNYL   Infusion. 0.5 MICROgram(s)/kG/Hr IV Continuous <Continuous>  propofol Infusion 10 MICROgram(s)/kG/Min IV Continuous <Continuous>  enoxaparin Injectable 90 milliGRAM(s) SubCutaneous daily  pantoprazole  Injectable 40 milliGRAM(s) IV Push daily  dextrose 40% Gel 15 Gram(s) Oral once PRN  dextrose 50% Injectable 12.5 Gram(s) IV Push once  dextrose 50% Injectable 25 Gram(s) IV Push once  dextrose 50% Injectable 25 Gram(s) IV Push once  glucagon  Injectable 1 milliGRAM(s) IntraMuscular once PRN  insulin regular Infusion 5 Unit(s)/Hr IV Continuous <Continuous>  dextrose 5%. 1000 milliLiter(s) IV Continuous <Continuous>  chlorhexidine 0.12% Liquid 15 milliLiter(s) Oral Mucosa every 12 hours      Mode: AC/ CMV (Assist Control/ Continuous Mandatory Ventilation)  RR (machine): 30  TV (machine): 400  FiO2: 60  PEEP: 8  ITime: 0.8  MAP: 13  PIP: 24      ICU Vital Signs Last 24 Hrs  T(C): 37.3 (14 Apr 2020 00:00), Max: 37.3 (14 Apr 2020 00:00)  T(F): 99.2 (14 Apr 2020 00:00), Max: 99.2 (14 Apr 2020 00:00)  HR: 105 (14 Apr 2020 00:00) (100 - 132)  BP: 111/54 (13 Apr 2020 07:10) (101/58 - 132/56)  BP(mean): 46 (13 Apr 2020 07:10) (46 - 78)  ABP: 114/52 (14 Apr 2020 00:00) (106/51 - 126/68)  ABP(mean): 91 (14 Apr 2020 00:00) (70 - 91)  RR: 30 (14 Apr 2020 00:00) (18 - 30)  SpO2: 98% (14 Apr 2020 00:00) (93% - 100%)    Vital Signs Last 24 Hrs  T(C): 37.3 (14 Apr 2020 00:00), Max: 37.3 (14 Apr 2020 00:00)  T(F): 99.2 (14 Apr 2020 00:00), Max: 99.2 (14 Apr 2020 00:00)  HR: 105 (14 Apr 2020 00:00) (100 - 132)  BP: 111/54 (13 Apr 2020 07:10) (101/58 - 132/56)  BP(mean): 46 (13 Apr 2020 07:10) (46 - 78)  RR: 30 (14 Apr 2020 00:00) (18 - 30)  SpO2: 98% (14 Apr 2020 00:00) (93% - 100%)    ABG - ( 13 Apr 2020 21:46 )  pH, Arterial: 7.31  pH, Blood: x     /  pCO2: 50    /  pO2: 80    / HCO3: 23    / Base Excess: -1.7  /  SaO2: 95          I&O's Detail    12 Apr 2020 07:01  -  13 Apr 2020 07:00  --------------------------------------------------------  IN:    fentaNYL Infusion.: 4.5 mL    propofol Infusion: 27 mL  Total IN: 31.5 mL    OUT:  Total OUT: 0 mL  Total NET: 31.5 mL      13 Apr 2020 07:01  -  14 Apr 2020 04:06  --------------------------------------------------------  IN:    EPINEPHrine Infusion: 1400 mL    fentaNYL Infusion.: 143.5 mL    insulin regular Infusion: 220 mL    IV PiggyBack: 300 mL    Lactated Ringers IV Bolus: 500 mL    norepinephrine Infusion: 812 mL    propofol Infusion: 208 mL  Total IN: 3583.5 mL    OUT:    Indwelling Catheter - Urethral: 48 mL  Total OUT: 48 mL  Total NET: 3535.5 mL      LABS:                        13.4   24.86 )-----------( 193      ( 13 Apr 2020 20:50 )             41.9     04-13    134<L>  |  89<L>  |  82.0<H>  ----------------------------<  656<HH>  3.8   |  21.0<L>  |  5.05<H>    Ca    7.6<L>      13 Apr 2020 20:50  Phos  7.4     04-13  Mg     3.0     04-13    TPro  5.9<L>  /  Alb  2.8<L>  /  TBili  0.7  /  DBili  x   /  AST  155<H>  /  ALT  54<H>  /  AlkPhos  118  04-13      CARDIAC MARKERS ( 13 Apr 2020 10:26 )  x     / 4.43 ng/mL / x     / x     / x      CARDIAC MARKERS ( 13 Apr 2020 06:50 )  x     / 1.37 ng/mL / x     / x     / x      CARDIAC MARKERS ( 13 Apr 2020 01:10 )  x     / 0.14 ng/mL / x     / x     / x          CAPILLARY BLOOD GLUCOSE  POCT Blood Glucose.: 236 mg/dL (14 Apr 2020 03:57)      RADIOLOGY:   < from: US Duplex Venous Lower Ext Complete, Bilateral (04.13.20 @ 06:58) >   EXAM:  US DPLX LWR EXT VEINS COMPL BI                          PROCEDURE DATE:  04/13/2020      INTERPRETATION:  CLINICAL INFORMATION: Bilateral leg swelling    COMPARISON: None available.    TECHNIQUE: Duplex sonography of the BILATERAL LOWERextremity veins with color and spectral Doppler, with and without compression.      FINDINGS:    Limited study due to patient positioning.    There is normal compressibility of the bilateral common femoral, femoral and popliteal veins.     Doppler examination shows normal spontaneous and phasic flow.    Nonvisualization of the calf veins bilaterally.    IMPRESSION:     No evidence of deep venous thrombosis in either lower extremity.    Nonvisualization of the calf veins bilaterally.      SALONI MUSTAFA M.D., ATTENDING RADIOLOGIST  This document has been electronically signed. Apr 13 2020  7:18AM        < end of copied text >      SUPPLEMENTAL O2: AC/VC  LINES: CVC/Shanon  IVF: N  SALINAS: Y  PPx: PPI, Lovenox  CONTACT: Y, COVID19 Patient is a 85y old  Male who presents with a chief complaint of     BRIEF HOSPITAL COURSE:   84 yo m DM, HTN, HLD, PAD presented 4/13 sob, found to be hypoxic to 22% on pulse ox and was subsequently intubated and vasopressor therapy admitted with COVID 19 respiratory failure.    4/13: Patient then quickly deteriorated, hypoxia, hypotension and then witnessed cardiac arrest, ACLS performed, high suspicion for massive PE and prompting tPA 100mg IVP, ROSC achieved, epinephrine added and vent requirements quickly escalated.     Events last 24 hours:   Overnight, vent requirements improving, remains on levophed and epi gtt.      PAST MEDICAL & SURGICAL HISTORY:  PAD (peripheral artery disease): s/p stent  High cholesterol  Diabetes  HTN (hypertension)  H/O craniotomy: subdural hematoma    Allergies  No Known Allergies      Review of Systems:  Unable to obtain 2/2 intubated/sedated      Medications:  hydroxychloroquine   Oral   hydroxychloroquine 400 milliGRAM(s) Oral every 24 hours  EPINEPHrine    Infusion 1 MICROgram(s)/kG/Min IV Continuous <Continuous>  norepinephrine Infusion 0.05 MICROgram(s)/kG/Min IV Continuous <Continuous>  fentaNYL   Infusion. 0.5 MICROgram(s)/kG/Hr IV Continuous <Continuous>  propofol Infusion 10 MICROgram(s)/kG/Min IV Continuous <Continuous>  enoxaparin Injectable 90 milliGRAM(s) SubCutaneous daily  pantoprazole  Injectable 40 milliGRAM(s) IV Push daily  dextrose 40% Gel 15 Gram(s) Oral once PRN  dextrose 50% Injectable 12.5 Gram(s) IV Push once  dextrose 50% Injectable 25 Gram(s) IV Push once  dextrose 50% Injectable 25 Gram(s) IV Push once  glucagon  Injectable 1 milliGRAM(s) IntraMuscular once PRN  insulin regular Infusion 5 Unit(s)/Hr IV Continuous <Continuous>  dextrose 5%. 1000 milliLiter(s) IV Continuous <Continuous>  chlorhexidine 0.12% Liquid 15 milliLiter(s) Oral Mucosa every 12 hours      Mode: AC/ CMV (Assist Control/ Continuous Mandatory Ventilation)  RR (machine): 30  TV (machine): 400  FiO2: 60  PEEP: 8  ITime: 0.8  MAP: 13  PIP: 24      ICU Vital Signs Last 24 Hrs  T(C): 37.3 (14 Apr 2020 00:00), Max: 37.3 (14 Apr 2020 00:00)  T(F): 99.2 (14 Apr 2020 00:00), Max: 99.2 (14 Apr 2020 00:00)  HR: 105 (14 Apr 2020 00:00) (100 - 132)  BP: 111/54 (13 Apr 2020 07:10) (101/58 - 132/56)  BP(mean): 46 (13 Apr 2020 07:10) (46 - 78)  ABP: 114/52 (14 Apr 2020 00:00) (106/51 - 126/68)  ABP(mean): 91 (14 Apr 2020 00:00) (70 - 91)  RR: 30 (14 Apr 2020 00:00) (18 - 30)  SpO2: 98% (14 Apr 2020 00:00) (93% - 100%)    Vital Signs Last 24 Hrs  T(C): 37.3 (14 Apr 2020 00:00), Max: 37.3 (14 Apr 2020 00:00)  T(F): 99.2 (14 Apr 2020 00:00), Max: 99.2 (14 Apr 2020 00:00)  HR: 105 (14 Apr 2020 00:00) (100 - 132)  BP: 111/54 (13 Apr 2020 07:10) (101/58 - 132/56)  BP(mean): 46 (13 Apr 2020 07:10) (46 - 78)  RR: 30 (14 Apr 2020 00:00) (18 - 30)  SpO2: 98% (14 Apr 2020 00:00) (93% - 100%)    ABG - ( 13 Apr 2020 21:46 )  pH, Arterial: 7.31  pH, Blood: x     /  pCO2: 50    /  pO2: 80    / HCO3: 23    / Base Excess: -1.7  /  SaO2: 95          I&O's Detail    12 Apr 2020 07:01  -  13 Apr 2020 07:00  --------------------------------------------------------  IN:    fentaNYL Infusion.: 4.5 mL    propofol Infusion: 27 mL  Total IN: 31.5 mL    OUT:  Total OUT: 0 mL  Total NET: 31.5 mL      13 Apr 2020 07:01  -  14 Apr 2020 04:06  --------------------------------------------------------  IN:    EPINEPHrine Infusion: 1400 mL    fentaNYL Infusion.: 143.5 mL    insulin regular Infusion: 220 mL    IV PiggyBack: 300 mL    Lactated Ringers IV Bolus: 500 mL    norepinephrine Infusion: 812 mL    propofol Infusion: 208 mL  Total IN: 3583.5 mL    OUT:    Indwelling Catheter - Urethral: 48 mL  Total OUT: 48 mL  Total NET: 3535.5 mL      LABS:                        13.4   24.86 )-----------( 193      ( 13 Apr 2020 20:50 )             41.9     04-13    134<L>  |  89<L>  |  82.0<H>  ----------------------------<  656<HH>  3.8   |  21.0<L>  |  5.05<H>    Ca    7.6<L>      13 Apr 2020 20:50  Phos  7.4     04-13  Mg     3.0     04-13    TPro  5.9<L>  /  Alb  2.8<L>  /  TBili  0.7  /  DBili  x   /  AST  155<H>  /  ALT  54<H>  /  AlkPhos  118  04-13      CARDIAC MARKERS ( 13 Apr 2020 10:26 )  x     / 4.43 ng/mL / x     / x     / x      CARDIAC MARKERS ( 13 Apr 2020 06:50 )  x     / 1.37 ng/mL / x     / x     / x      CARDIAC MARKERS ( 13 Apr 2020 01:10 )  x     / 0.14 ng/mL / x     / x     / x          CAPILLARY BLOOD GLUCOSE  POCT Blood Glucose.: 236 mg/dL (14 Apr 2020 03:57)      RADIOLOGY:   < from: US Duplex Venous Lower Ext Complete, Bilateral (04.13.20 @ 06:58) >   EXAM:  US DPLX LWR EXT VEINS COMPL BI                          PROCEDURE DATE:  04/13/2020      INTERPRETATION:  CLINICAL INFORMATION: Bilateral leg swelling    COMPARISON: None available.    TECHNIQUE: Duplex sonography of the BILATERAL LOWERextremity veins with color and spectral Doppler, with and without compression.      FINDINGS:    Limited study due to patient positioning.    There is normal compressibility of the bilateral common femoral, femoral and popliteal veins.     Doppler examination shows normal spontaneous and phasic flow.    Nonvisualization of the calf veins bilaterally.    IMPRESSION:     No evidence of deep venous thrombosis in either lower extremity.    Nonvisualization of the calf veins bilaterally.      SALONI MUSTAFA M.D., ATTENDING RADIOLOGIST  This document has been electronically signed. Apr 13 2020  7:18AM        < end of copied text >      SUPPLEMENTAL O2: AC/VC  LINES: CVC/[place]  IVF: N  SALINAS: Y  PPx: PPI, Lovenox  CONTACT: Y, COVID19

## 2020-04-14 NOTE — PROGRESS NOTE ADULT - ASSESSMENT
Assess    COVID pneumonia with ARDS, marked thrombosis, acute hypoxic and hypercarbic respiratory failure and acute renal failure  SOFA score not compatible with survival  SonTripp
Respiratory failure secondary to Covid 19 Pneumonitis/ ARDS  s/p arrest with possible PE( can not be evaluated and treated)  remains in shock with worsening renal failure    Plan:  IV bolus then 125cc/hr NS  Vent  pressors
84 yo m DM, HTN, HLD, PAD presented 4/13 sob, found to be hypoxic to 22% on pulse ox and was subsequently intubated and vasopressor therapy admitted with COVID 19 respiratory failure with course complicated by Cardiac arrest 2/2 suspected massive PE.      NEURO: Sedated on propofol and fentanyl.   CV: Shock, multifactorial distributive vs obstructive. Actively titrating levophed and epi for map >65, weaning as tolerated.    RESP: Hypoxic respiratory failure 2/2 covid19, ac/vc 6cc/kg tv lung protective strategies, actively titrating fio2 and peep for spo2 >88%, weaning as tolerated.  Greatly improved s/p tpa administration   RENAL: BENITEZ with progressively worsening cr, urine output minimal overnight ~50 cc  GI: NPO except tf, tolerating well  ENDO: Hyperglycemic requiring insulin gtt, poct q1hrs, when readings <150 x3 will transition to Lantus/ISS  ID: Plaquenil for COVID19 tx  HEME: PE, received tPA yesterday, now on FD lovenox for ac  DISPO: Full code.     Critical Care time: 40 mins assessing presenting problems of acute illness that poses high probability of life threatening deterioration or end organ damage/dysfunction.  Medical decision making including Initiating plan of care, reviewing data, reviewing radiology, discussing with multidisciplinary team, non inclusive of procedures, discussing goals of care with patient/family

## 2020-04-15 PROCEDURE — 85379 FIBRIN DEGRADATION QUANT: CPT

## 2020-04-15 PROCEDURE — 99291 CRITICAL CARE FIRST HOUR: CPT | Mod: 25

## 2020-04-15 PROCEDURE — 93005 ELECTROCARDIOGRAM TRACING: CPT

## 2020-04-15 PROCEDURE — 36415 COLL VENOUS BLD VENIPUNCTURE: CPT

## 2020-04-15 PROCEDURE — 83735 ASSAY OF MAGNESIUM: CPT

## 2020-04-15 PROCEDURE — 83880 ASSAY OF NATRIURETIC PEPTIDE: CPT

## 2020-04-15 PROCEDURE — 93970 EXTREMITY STUDY: CPT

## 2020-04-15 PROCEDURE — 86140 C-REACTIVE PROTEIN: CPT

## 2020-04-15 PROCEDURE — 84484 ASSAY OF TROPONIN QUANT: CPT

## 2020-04-15 PROCEDURE — 84132 ASSAY OF SERUM POTASSIUM: CPT

## 2020-04-15 PROCEDURE — 84295 ASSAY OF SERUM SODIUM: CPT

## 2020-04-15 PROCEDURE — 71045 X-RAY EXAM CHEST 1 VIEW: CPT

## 2020-04-15 PROCEDURE — 84145 PROCALCITONIN (PCT): CPT

## 2020-04-15 PROCEDURE — 83615 LACTATE (LD) (LDH) ENZYME: CPT

## 2020-04-15 PROCEDURE — 51702 INSERT TEMP BLADDER CATH: CPT | Mod: XU

## 2020-04-15 PROCEDURE — 82803 BLOOD GASES ANY COMBINATION: CPT

## 2020-04-15 PROCEDURE — 82435 ASSAY OF BLOOD CHLORIDE: CPT

## 2020-04-15 PROCEDURE — 82947 ASSAY GLUCOSE BLOOD QUANT: CPT

## 2020-04-15 PROCEDURE — 84100 ASSAY OF PHOSPHORUS: CPT

## 2020-04-15 PROCEDURE — 85027 COMPLETE CBC AUTOMATED: CPT

## 2020-04-15 PROCEDURE — 85014 HEMATOCRIT: CPT

## 2020-04-15 PROCEDURE — 80053 COMPREHEN METABOLIC PANEL: CPT

## 2020-04-15 PROCEDURE — 94002 VENT MGMT INPAT INIT DAY: CPT

## 2020-04-15 PROCEDURE — 82330 ASSAY OF CALCIUM: CPT

## 2020-04-15 PROCEDURE — 85652 RBC SED RATE AUTOMATED: CPT

## 2020-04-15 PROCEDURE — 94003 VENT MGMT INPAT SUBQ DAY: CPT

## 2020-04-15 PROCEDURE — 83036 HEMOGLOBIN GLYCOSYLATED A1C: CPT

## 2020-04-15 PROCEDURE — 31500 INSERT EMERGENCY AIRWAY: CPT

## 2020-04-15 PROCEDURE — 93306 TTE W/DOPPLER COMPLETE: CPT

## 2020-04-15 PROCEDURE — 82962 GLUCOSE BLOOD TEST: CPT

## 2020-04-15 PROCEDURE — 82728 ASSAY OF FERRITIN: CPT

## 2020-04-15 PROCEDURE — 83605 ASSAY OF LACTIC ACID: CPT

## 2020-04-15 RX ADMIN — MIDAZOLAM HYDROCHLORIDE 4 MILLIGRAM(S): 1 INJECTION, SOLUTION INTRAMUSCULAR; INTRAVENOUS at 06:12

## 2020-04-15 NOTE — DISCHARGE NOTE FOR THE EXPIRED PATIENT - HOSPITAL COURSE
86 y/o M with DM, HTN, HLD, and PAD presents to ED in respiratory distress, pt has had 1 week of respiratory symptoms last week  tested for COVID, PCR COVID (+). It was reported that pt was found in his car by his family with difficultly breathing.  Daughter in law brought him to ED after patient's wife called her stating patient was not breathing properly .In triage, SPO2 was 22% on RA, patient brought to critical care, and was subsequently intubated, also placed on levophed transiently post intubation. Pt admitted to the ICU for acute hypoxic respiratory failure, BENITEZ ATN, distributive vs. septic shock, r/o PE (CTA not able to be performed 2/ BENITEZ Cr 2.4).  Pt treated with empiric TPA    Events last 24 hours:  Pt made comfort care and  at 8:04am 4/15/20 Statement Selected

## 2020-04-28 ENCOUNTER — APPOINTMENT (OUTPATIENT)
Dept: CARDIOLOGY | Facility: CLINIC | Age: 85
End: 2020-04-28

## 2020-05-04 ENCOUNTER — APPOINTMENT (OUTPATIENT)
Dept: INTERNAL MEDICINE | Facility: CLINIC | Age: 85
End: 2020-05-04

## 2020-12-21 PROBLEM — J06.9 URI, ACUTE: Status: RESOLVED | Noted: 2019-05-15 | Resolved: 2020-12-21

## 2021-09-03 NOTE — HEALTH RISK ASSESSMENT
MD has discussed The FDA has authorized the emergency use of REGN-COV2  , which is not an FDA approved drug. Discussions with the patient regarding the risks and benefits of  REGN-COV2  have occurred. The patient recognizes that this is an investigational treatment which may offer significant known and potential benefits and risks, the extent of which are unknown. Information on available alternative treatments and the risks and benefits of those alternatives was discussed. The patient received the “Fact Sheet for Patients Parents and Caregivers”. All questions from the patient were answered to satisfaction. The patient has the option to accept or refuse treatment with REGN-COV2 and would like to accept treatment.  Education handouts have been given to patient.    Counseling regarding continued self isolation and use of infection control measures according to the CDC guidelines has occurred.       Cheryl Williamson RN  09/03/21 0001     [0] : 2) Feeling down, depressed, or hopeless: Not at all (0) [[id number]] : 0

## 2024-02-29 NOTE — DISCHARGE NOTE PROVIDER - NS AS DC PROVIDER CONTACT Y/N MULTI
Hpi Title: Evaluation of a Skin Lesion How Severe Is Your Skin Lesion?: mild Has Your Skin Lesion Been Treated?: not been treated Is This A New Presentation, Or A Follow-Up?: Skin Lesion Yes